# Patient Record
Sex: FEMALE | Race: WHITE | NOT HISPANIC OR LATINO
[De-identification: names, ages, dates, MRNs, and addresses within clinical notes are randomized per-mention and may not be internally consistent; named-entity substitution may affect disease eponyms.]

---

## 2022-06-15 ENCOUNTER — APPOINTMENT (OUTPATIENT)
Dept: ORTHOPEDIC SURGERY | Facility: CLINIC | Age: 77
End: 2022-06-15
Payer: MEDICARE

## 2022-06-15 VITALS — HEIGHT: 58 IN | BODY MASS INDEX: 28.34 KG/M2 | WEIGHT: 135 LBS

## 2022-06-15 DIAGNOSIS — Z63.4 DISAPPEARANCE AND DEATH OF FAMILY MEMBER: ICD-10-CM

## 2022-06-15 DIAGNOSIS — Z78.9 OTHER SPECIFIED HEALTH STATUS: ICD-10-CM

## 2022-06-15 DIAGNOSIS — Z86.39 PERSONAL HISTORY OF OTHER ENDOCRINE, NUTRITIONAL AND METABOLIC DISEASE: ICD-10-CM

## 2022-06-15 PROBLEM — Z00.00 ENCOUNTER FOR PREVENTIVE HEALTH EXAMINATION: Status: ACTIVE | Noted: 2022-06-15

## 2022-06-15 PROCEDURE — 73562 X-RAY EXAM OF KNEE 3: CPT | Mod: RT

## 2022-06-15 PROCEDURE — 20610 DRAIN/INJ JOINT/BURSA W/O US: CPT | Mod: RT

## 2022-06-15 PROCEDURE — 99204 OFFICE O/P NEW MOD 45 MIN: CPT | Mod: 25

## 2022-06-15 RX ORDER — SIMVASTATIN 20 MG/1
20 TABLET, FILM COATED ORAL
Refills: 0 | Status: ACTIVE | COMMUNITY

## 2022-06-15 SDOH — SOCIAL STABILITY - SOCIAL INSECURITY: DISSAPEARANCE AND DEATH OF FAMILY MEMBER: Z63.4

## 2022-06-15 NOTE — PHYSICAL EXAM
[5___] : hamstring 5[unfilled]/5 [Right] : right knee [AP] : anteroposterior [Lateral] : lateral [Mild tricompartmental OA medial narrowing] : Mild tricompartmental OA medial narrowing [] : no increased warmth

## 2022-06-15 NOTE — PROCEDURE
[Right] : of the right [Knee] : knee [___ cc    6mg] :  Betamethasone (Celestone) ~Vcc of 6mg [___ cc    1%] : Lidocaine ~Vcc of 1%  [] : Patient tolerated procedure well [Call if redness, pain or fever occur] : call if redness, pain or fever occur [Apply ice for 15min out of every hour for the next 12-24 hours as tolerated] : apply ice for 15 minutes out of every hour for the next 12-24 hours as tolerated [Previous OTC use and PT nontherapeutic] : patient has tried OTC's including aspirin, Ibuprofen, Aleve, etc or prescription NSAIDS, and/or exercises at home and/or physical therapy without satisfactory response [Patient had decreased mobility in the joint] : patient had decreased mobility in the joint [Risks, benefits, alternatives discussed / Verbal consent obtained] : the risks benefits, and alternatives have been discussed, and verbal consent was obtained

## 2022-06-15 NOTE — DISCUSSION/SUMMARY
[de-identified] : Options were discussed. Plan is to go forward with CSI in the right knee.\par The Risks, benefits, alternatives and expectations of the proposed procedure, as well as non-operative management, were discussed at length with the patient, as well as the procedure itself and the expected recovery period. The possible need for post operative Physical Therapy was also discussed. The patient was allowed as much tome as necessary to ask all appropriate questions and they were answered to the patient's satisfaction.\par \par f/u 1 month\par \par Entered by Agustin Allen acting as scribe.\par Dr. Aparicio Attestation\par The documentation recorded by the scribe, in my presence, accurately reflects the service I, Dr. Aparicio, personally performed, and the decisions made by me with my edits as appropriate.\par

## 2022-06-15 NOTE — DISCUSSION/SUMMARY
[de-identified] : Options were discussed. Plan is to go forward with CSI in the right knee.\par The Risks, benefits, alternatives and expectations of the proposed procedure, as well as non-operative management, were discussed at length with the patient, as well as the procedure itself and the expected recovery period. The possible need for post operative Physical Therapy was also discussed. The patient was allowed as much tome as necessary to ask all appropriate questions and they were answered to the patient's satisfaction.\par \par f/u 1 month\par \par Entered by Agustin Allen acting as scribe.\par Dr. Aparicio Attestation\par The documentation recorded by the scribe, in my presence, accurately reflects the service I, Dr. Aparicio, personally performed, and the decisions made by me with my edits as appropriate.\par

## 2022-07-27 ENCOUNTER — APPOINTMENT (OUTPATIENT)
Dept: ORTHOPEDIC SURGERY | Facility: CLINIC | Age: 77
End: 2022-07-27

## 2022-07-27 PROCEDURE — 99213 OFFICE O/P EST LOW 20 MIN: CPT

## 2022-07-27 NOTE — DISCUSSION/SUMMARY
[de-identified] : Pateint reports 50% better after CSI\par \par Options were discussed including observation, lubricant injection, MRI, scope. \par \par Plan is to observe at this time f/u PRN

## 2022-07-27 NOTE — HISTORY OF PRESENT ILLNESS
[de-identified] : Patient is here today for the right knee. Patient had CSI injection and got some good relief but could not go up stairs. Patient states the knee feels about 50% of normal. SHe has more tolerance to drive now.

## 2022-10-19 ENCOUNTER — APPOINTMENT (OUTPATIENT)
Dept: ORTHOPEDIC SURGERY | Facility: CLINIC | Age: 77
End: 2022-10-19

## 2022-10-19 PROCEDURE — 73030 X-RAY EXAM OF SHOULDER: CPT | Mod: RT

## 2022-10-19 PROCEDURE — 99203 OFFICE O/P NEW LOW 30 MIN: CPT | Mod: 25

## 2022-10-19 PROCEDURE — 20610 DRAIN/INJ JOINT/BURSA W/O US: CPT | Mod: RT

## 2022-10-19 NOTE — PHYSICAL EXAM
[Right] : right shoulder [AC Joint Arthrosis] : AC Joint Arthrosis [de-identified] : Constitutional: The patient appears well developed, well nourished. Examination of patients ability to communicate functionally was normal. \par \par Neurologic: Coordination is normal. Alert and oriented to time, place and person. No evidence of mood disorder, calm affect. \par \par     RIGHT  SHOULDER: Inspection of the shoulder/upper arm is as follows: no swelling, no erythema, no ecchymosis, no atrophy and no deformity.  DIME SIZED SCABBING OVER THE POSTEROLATERAL ACROMIAL REGION> NO SIGNS OF INFECTION NOTED>\par \par Palpation of the shoulder/upper arm is as follows: Tenderness is noted at the anterior shoulder and lateral shoulder and bicipital groove \par \par Range of motion of the shoulder is as follows in degrees:\par Pain with internal rotation, external rotation, abduction, and forward flexion. \par \par active forward flexion to: 180 \par active abduction to: 180  \par internal rotation to: T8 \par external rotation with arm to side: 90\par \par Strength of the shoulder is as follows: \par \par Forward flexion 5/5\par Abduction 5-/5\par External Rotation 5/5  \par Internal Rotation 5/5 \par \par Ligament Stability and Special Tests of the shoulder is as follows: Impingement testing is positive. Hawkin's testing is positive. There is positive arc of pain. Shoulder apprehension shows to be negative. Shoulder relocation is negative. Drop-arm testing is negative. \par \par Neurological testing of the shoulder is as follows: reflexes intact, No sensory deficits, motor and sensor intact distally and no scapular winging. \par   [FreeTextEntry1] : inferior GH joint spurring. no frx noted.

## 2022-10-19 NOTE — HISTORY OF PRESENT ILLNESS
[de-identified] : Patient is here today for the right shoulder. Patient is RHD. She also notes left shoulder pain but recently the Right has been worse since a fall to the Right shoulder. She has had pain prior for over a yrs. She notes pain lateral and posterior shoulder worse with overhead motion and at night when sleeping on it.

## 2022-10-19 NOTE — PROCEDURE
[Right] : of the right [Subacromial Space] : subacromial space [Pain] : pain [Inflammation] : inflammation [Betadine] : betadine [Sterile technique used] : sterile technique used [___ cc    6mg] :  Betamethasone (Celestone) ~Vcc of 6mg [___ cc    1%] : Lidocaine ~Vcc of 1%  [] : Patient tolerated procedure well [Call if redness, pain or fever occur] : call if redness, pain or fever occur [Apply ice for 15min out of every hour for the next 12-24 hours as tolerated] : apply ice for 15 minutes out of every hour for the next 12-24 hours as tolerated [Previous OTC use and PT nontherapeutic] : patient has tried OTC's including aspirin, Ibuprofen, Aleve, etc or prescription NSAIDS, and/or exercises at home and/or physical therapy without satisfactory response [Patient had decreased mobility in the joint] : patient had decreased mobility in the joint [Risks, benefits, alternatives discussed / Verbal consent obtained] : the risks benefits, and alternatives have been discussed, and verbal consent was obtained

## 2022-11-16 ENCOUNTER — APPOINTMENT (OUTPATIENT)
Dept: ORTHOPEDIC SURGERY | Facility: CLINIC | Age: 77
End: 2022-11-16

## 2022-12-12 ENCOUNTER — APPOINTMENT (OUTPATIENT)
Dept: ORTHOPEDIC SURGERY | Facility: CLINIC | Age: 77
End: 2022-12-12

## 2022-12-12 ENCOUNTER — TRANSCRIPTION ENCOUNTER (OUTPATIENT)
Age: 77
End: 2022-12-12

## 2022-12-12 PROCEDURE — 99204 OFFICE O/P NEW MOD 45 MIN: CPT

## 2022-12-12 RX ORDER — METHYLPREDNISOLONE 4 MG/1
4 TABLET ORAL
Qty: 1 | Refills: 0 | Status: ACTIVE | COMMUNITY
Start: 2022-12-12 | End: 1900-01-01

## 2022-12-12 NOTE — DISCUSSION/SUMMARY
[Medication Risks Reviewed] : Medication risks reviewed [de-identified] : Options were discussed in length including NSAIDS, anti-inflammatories, oral steroids, or physical therapy.\par Recommended oral steroids and physical therapy. PT script provided in office today. Pt will take medrol dose pack.\par follow up in 3-4 weeks and we will get an xray of lumbar spine.\par \par Entered by Cheyanne Wade acting as Scribe.\par Dr. Roberts Attestation\par The documentation recorded by the scribe, in my presence, accurately reflects the service I, Dr. Roberts, personally performed, and the decisions made by me with my edits as appropriate.\par

## 2022-12-12 NOTE — HISTORY OF PRESENT ILLNESS
[Lower back] : lower back [Gradual] : gradual [7] : 7 [Localized] : localized [Sharp] : sharp [Stabbing] : stabbing [Tightness] : tightness [Household chores] : household chores [Leisure] : leisure [Rest] : rest [Meds] : meds [Sitting] : sitting [Retired] : Work status: retired [de-identified] : Patient presents in office today with lower back pain. NKI chronic back pain. August 2022 she had sciatica pain radiating down her leg, treated with acupuncture and had relief. States she has felt like the pain has increased, and it is stiff. Pt denies physical therapy. [] : This patient has had an injection before: no [FreeTextEntry7] : pain is a littile more on the left side [de-identified] : bone density test

## 2022-12-15 ENCOUNTER — FORM ENCOUNTER (OUTPATIENT)
Age: 77
End: 2022-12-15

## 2022-12-20 ENCOUNTER — FORM ENCOUNTER (OUTPATIENT)
Age: 77
End: 2022-12-20

## 2023-01-09 ENCOUNTER — APPOINTMENT (OUTPATIENT)
Dept: ORTHOPEDIC SURGERY | Facility: CLINIC | Age: 78
End: 2023-01-09
Payer: MEDICARE

## 2023-01-09 PROCEDURE — 72100 X-RAY EXAM L-S SPINE 2/3 VWS: CPT

## 2023-01-09 PROCEDURE — 72040 X-RAY EXAM NECK SPINE 2-3 VW: CPT

## 2023-01-09 PROCEDURE — 99214 OFFICE O/P EST MOD 30 MIN: CPT

## 2023-01-09 RX ORDER — METHYLPREDNISOLONE 4 MG/1
4 TABLET ORAL
Qty: 1 | Refills: 0 | Status: ACTIVE | COMMUNITY
Start: 2023-01-09 | End: 1900-01-01

## 2023-01-09 NOTE — PHYSICAL EXAM
[Disc space narrowing] : Disc space narrowing [] : full ROM with pain [FreeTextEntry1] : no fx seen. mild DJD

## 2023-01-09 NOTE — DISCUSSION/SUMMARY
[Medication Risks Reviewed] : Medication risks reviewed [de-identified] : Reviewed and discussed neurologic evaluation \par A Medrol dose Fredi was prescribed today. Patient was informed that while taking the Medrol, they shouldn't be taking any other anti-inflammatories. Pt understands and all questions were answered, and possible side effects of medrol were discussed. \par Patient was referred to Dr Blackwell\par \par \par

## 2023-01-09 NOTE — ADDENDUM
[FreeTextEntry1] : CORRECTION: patient was not seen for cervical spine. The x-ray for cervical spine was sent by mistake. The patient was seen for her lumbar spine only.

## 2023-01-09 NOTE — HISTORY OF PRESENT ILLNESS
[Lower back] : lower back [Constant] : constant [Bending/leaning forward] : bending/leaning forward [Extending back] : extending back [de-identified] : 01/09/23: F/U Pt for her lower back. Pt states she has been going to PT 2x a week since 12/15/22 and says she is still the same. States she uses ice for pain relief.  [] : no [FreeTextEntry6] : N/A

## 2023-02-02 ENCOUNTER — APPOINTMENT (OUTPATIENT)
Dept: PHYSICAL MEDICINE AND REHAB | Facility: CLINIC | Age: 78
End: 2023-02-02
Payer: MEDICARE

## 2023-02-02 PROCEDURE — 99205 OFFICE O/P NEW HI 60 MIN: CPT

## 2023-02-02 NOTE — PHYSICAL EXAM
[Normal] : Normal bowel sounds, soft, non-tender, no hepato-splenomegaly and no abdominal mass palpated [FreeTextEntry1] : LOW BACK & LOWER EXTREMITIES\par \par \par REFLEXES (R)LE:		\par                   QUADRICEPS  2	 \par                   ACHILLES 2\par 	 \par REFLEXES (L) LE:		\par 	QUADRICEPS 2	\par 	 ACHILLES 2\par \par PERIPHERAL PULSES BILATERAL LE\par \par SENSORY: Intact \par \par NORMAL GAIT \par NO GROSS ATROPHY\par \par TESTING:	CHIKA’S (R) [- ]\par 	CHIKA’S (L) [- ]\par 	BABINSKI [downgoing ]\par 	CHADDOCK [- ]\par 	OPPENHEIM [ -]\par 	GONDA [- ]\par 	CLONUS [- ]\par 	LESEAGUE’S (R) [- ]\par 	LESEAGUE’S (L) [ -]\par 		DANIEL’S [- ]\par SI JT LIG.CHALLENEGE TEST (R) -\par SI JT LIG.CHALLENEGE TEST (L) +\par 	S.L.R. ( R ) -60 degrees with hamstring tightness \par 	S.L.R. ( L ) -60  degrees with hamstring tightness \par RANGE OF MOTION:\par 	FLEXION 50 degrees\par 	EXTENSION 30 degrees\par 	LATERAL BENDING: ( R ) 25 degrees\par 	LATERAL BENDING: ( L ) 30 degrees\par 	THORACIC ROTATION ( R ) 35 degrees\par 	THORACIC ROTATION ( L ) 30 degrees \par  	INTERNAL ROTATION FEMUR ( R ) WNL\par 	EXTERNAL ROTATION FEMUR ( R ) WNL \par 	INTERNAL ROTATION FEMUR ( L ) WNL \par 	EXTERNAL ROTATION FEMUR ( R ) WNL \par \par VIBRATORY: intact\par PROPRIOCEPTION: intact \par 	MMT: intact\par PALPATION OF THE LUMBAR SPINE: Tenderness involving the L4/L5 and L5/S1 interspace. Tenderness involving the bilateral sacroiliac joints with greater involvement on the left. Tenderness and moderate spasm involving the bilateral lower lumbar paraspinal musculature with greater involvement on the left. Trigger points involving the bilateral gluteal musculature with greater involvement on the left. \par

## 2023-02-02 NOTE — ASSESSMENT
[FreeTextEntry1] : TRigger points injections 1xweekly for 6 weeks goal of which is to decrease pain, dissipate muscle spasm, increase ROM, and improve level of function. \par HEP reviewed with patient \par Emphasis placed on the need for frequent change of positions from sit to stand and stand to sit.

## 2023-02-02 NOTE — HISTORY OF PRESENT ILLNESS
[FreeTextEntry1] : Pt is a 77 year old female with a hx of chronic intermittent low back pain. Pt reports appprox. 6 months she experienced an acute exacerbation of low back pain with radicular symptoms involving her left lower extremity. \par She  f/u with  and received course of acupuncture with improvement reported. She was eval by Dr. Roberts and x-ray of lumbar spine was obtained, Medrol dose pack was prescribed  and completed course of physical therapy. Pt states her radicular symptoms have improved however, she continues with c/o low back pain which is aggravated with prolonged sitting, and standing, as well as bending activities. She denies bowel or bladder dysfunction. \par Pt was referred to my office today for evaluation of trigger point injections/acupuncture for treatment of her L/S complaints.

## 2023-02-06 ENCOUNTER — APPOINTMENT (OUTPATIENT)
Dept: PHYSICAL MEDICINE AND REHAB | Facility: CLINIC | Age: 78
End: 2023-02-06
Payer: MEDICARE

## 2023-02-06 PROCEDURE — 20553 NJX 1/MLT TRIGGER POINTS 3/>: CPT

## 2023-02-06 PROCEDURE — 99213 OFFICE O/P EST LOW 20 MIN: CPT | Mod: 25

## 2023-02-06 RX ADMIN — Medication 10 %: at 00:00

## 2023-02-06 NOTE — PROCEDURE
[de-identified] : Sterile Technique Injection \par 2 Syringes of 5 cc 1 % Lidocaine HCL \par \par Left Gluteus medius \par Left Gluteus marvin \par Left gluteal medius \par \par Ice injection site PRN \par Injection tolerated well.\par \par \par EXAMINATION OF LUMBAR SPINE:\par  \par Flexion:  50° \par Extension:  20°\par Lateral Bend: R: 25° \par                        L:  25°\par  \par Palpation of the Lumbar Spine: Trigger points bilateral gluteal musculature with greater involvement on the left. \par \par MMT L/E: intact \par \par Reflexes: 2 and symmetrical throughout \par \par \par \par

## 2023-02-13 ENCOUNTER — APPOINTMENT (OUTPATIENT)
Dept: PHYSICAL MEDICINE AND REHAB | Facility: CLINIC | Age: 78
End: 2023-02-13
Payer: MEDICARE

## 2023-02-13 PROCEDURE — 20553 NJX 1/MLT TRIGGER POINTS 3/>: CPT

## 2023-02-13 PROCEDURE — 99213 OFFICE O/P EST LOW 20 MIN: CPT | Mod: 25

## 2023-02-13 RX ADMIN — Medication 10 %: at 00:00

## 2023-02-13 NOTE — PROCEDURE
[de-identified] : Pt finding TPIs beneficial. Notes decreasing pain and increased mobility. \par \par Sterile Technique Injection \par 2 Syringes of 5 cc 1 % Lidocaine HCL \par \par Left Gluteus medius \par Left Gluteus marvin \par Left gluteal medius \par \par Ice injection site PRN \par Injection tolerated well.\par \par \par EXAMINATION OF LUMBAR SPINE:\par  \par Flexion:  55° \par Extension:  20°\par Lateral Bend: R: 30° \par                        L:  25°\par  \par Palpation of the Lumbar Spine: Trigger points left gluteal musculature improving.\par \par MMT L/E: intact \par \par Reflexes: 2 and symmetrical throughout \par \par \par \par

## 2023-02-21 ENCOUNTER — APPOINTMENT (OUTPATIENT)
Dept: PHYSICAL MEDICINE AND REHAB | Facility: CLINIC | Age: 78
End: 2023-02-21
Payer: MEDICARE

## 2023-02-21 PROCEDURE — 99213 OFFICE O/P EST LOW 20 MIN: CPT | Mod: 25

## 2023-02-21 PROCEDURE — 20553 NJX 1/MLT TRIGGER POINTS 3/>: CPT

## 2023-02-21 RX ADMIN — Medication 10 %: at 00:00

## 2023-02-21 NOTE — PROCEDURE
[de-identified] : Pt finding TPIs beneficial. Notes decreasing pain, increased mobility and prolonged ability to sit. \par \par Sterile Technique Injection \par 2 Syringes of 5 cc 1 % Lidocaine HCL \par \par bilateral Gluteus medius \par bilateral Gluteus marvin \par \par Ice injection site PRN \par Injection tolerated well.\par \par \par EXAMINATION OF LUMBAR SPINE:\par  \par Flexion:  55° \par Extension:  20°\par Lateral Bend: R: 30° \par                        L:  25°\par  \par Palpation of the Lumbar Spine: Trigger points left gluteal musculature improving.\par \par MMT L/E: intact \par \par Reflexes: 2 and symmetrical throughout \par \par \par \par

## 2023-02-28 ENCOUNTER — APPOINTMENT (OUTPATIENT)
Dept: PHYSICAL MEDICINE AND REHAB | Facility: CLINIC | Age: 78
End: 2023-02-28
Payer: MEDICARE

## 2023-02-28 PROCEDURE — 20553 NJX 1/MLT TRIGGER POINTS 3/>: CPT

## 2023-02-28 PROCEDURE — 99213 OFFICE O/P EST LOW 20 MIN: CPT | Mod: 25

## 2023-02-28 RX ADMIN — Medication 10 %: at 00:00

## 2023-02-28 NOTE — PROCEDURE
[de-identified] : Pt finding TPIs beneficial. Notes decreasing pain, increased mobility, increased level of physical activity and prolonged ability to sit. Reports improvement with sleeping as well. \par \par Sterile Technique Injection \par 2 Syringes of 5 cc 1 % Lidocaine HCL \par \par Right Gluteus medius \par Right Gluteus marvin \par Right Gluteus minimus \par \par Ice injection site PRN \par Injection tolerated well.\par \par \par EXAMINATION OF LUMBAR SPINE:\par  \par Flexion:  55° \par Extension:  20°\par Lateral Bend: R: 30° \par                        L:  30°\par  \par Palpation of the Lumbar Spine: Trigger points right gluteal musculature improved. Trigger points left gluteal musculature improving, but persist. \par \par MMT L/E: intact \par \par Reflexes: 2 and symmetrical throughout \par \par \par \par

## 2023-03-01 ENCOUNTER — APPOINTMENT (OUTPATIENT)
Dept: ORTHOPEDIC SURGERY | Facility: CLINIC | Age: 78
End: 2023-03-01
Payer: MEDICARE

## 2023-03-01 PROCEDURE — 99215 OFFICE O/P EST HI 40 MIN: CPT | Mod: 25

## 2023-03-01 PROCEDURE — 73030 X-RAY EXAM OF SHOULDER: CPT | Mod: LT

## 2023-03-01 PROCEDURE — 20610 DRAIN/INJ JOINT/BURSA W/O US: CPT | Mod: LT

## 2023-03-01 NOTE — PHYSICAL EXAM
[Left] : left shoulder [There are no fractures, subluxations or dislocations. No significant abnormalities are seen] : There are no fractures, subluxations or dislocations. No significant abnormalities are seen [Type 3 acromion] : Type 3 acromion [AC Joint Arthrosis] : AC Joint Arthrosis [Glenohumeral arthritis] : Glenohumeral arthritis [de-identified] : Constitutional: The patient appears well developed, well nourished. Examination of patients ability to communicate functionally was normal.  \par \par  \par \par Neurologic: Coordination is normal. Alert and oriented to time, place and person. No evidence of mood disorder, calm affect.  \par \par  \par \par    LEFT   SHOULDER: Inspection of the shoulder/upper arm is as follows: no swelling, no erythema, no ecchymosis, no atrophy and no deformity.  \par \par  \par \par Palpation of the shoulder/upper arm is as follows: Tenderness is noted at the anterior shoulder and lateral shoulder and bicipital groove  \par \par  \par \par Range of motion of the shoulder is as follows in degrees: \par \par Pain with internal rotation, external rotation, abduction, and forward flexion.  \par \par  \par \par active forward flexion to: 170  \par \par active abduction to: 165   \par \par internal rotation to: T8  \par \par external rotation with arm to side: 90 \par \par  \par \par Strength of the shoulder is as follows:  \par \par  \par \par Forward flexion 5/5 \par \par Abduction 5/5 \par \par External Rotation 5/5   \par \par Internal Rotation 5/5  \par \par  \par \par Ligament Stability and Special Tests of the shoulder is as follows: Impingement testing is positive. Hawkin's testing is positive. There is positive arc of pain. Shoulder apprehension shows to be negative. Shoulder relocation is negative. Drop-arm testing is negative.  \par \par  \par \par Neurological testing of the shoulder is as follows: reflexes intact, No sensory deficits, motor and sensor intact distally and no scapular winging.  \par \par

## 2023-03-01 NOTE — HISTORY OF PRESENT ILLNESS
[de-identified] : patient is here today for the left shoulder. patient /co Left shoulder pain x 2 mos. denies any injury. Pain is posterolateral and lateral shoulder worse with sudden movements and at night. Denies any paresthesias or weakness. She tried OTC motrin with min relief.

## 2023-03-13 ENCOUNTER — APPOINTMENT (OUTPATIENT)
Dept: PHYSICAL MEDICINE AND REHAB | Facility: CLINIC | Age: 78
End: 2023-03-13
Payer: MEDICARE

## 2023-03-13 PROCEDURE — 20553 NJX 1/MLT TRIGGER POINTS 3/>: CPT

## 2023-03-13 PROCEDURE — 99213 OFFICE O/P EST LOW 20 MIN: CPT | Mod: 25

## 2023-03-13 NOTE — PROCEDURE
[de-identified] : Pt finding TPIs beneficial. Notes decreasing pain, increased mobility, increased level of physical activity and prolonged ability to sit. Reports improvement with sleeping as well. \par \par Sterile Technique Injection \par 2 Syringes of 5 cc 1 % Lidocaine HCL \par \par Bilateral  Gluteus medius \par BIlateral Gluteus marvin \par Bilateral  Gluteus minimus \par \par Ice injection site PRN \par Injection tolerated well.\par \par \par EXAMINATION OF LUMBAR SPINE:\par  \par Flexion:  55° \par Extension:  20°\par Lateral Bend: R: 35° \par                        L:  30°\par  \par Palpation of the Lumbar Spine: Trigger points right gluteal musculature improved. Trigger points left gluteal musculature improving, but persist. \par \par MMT L/E: intact \par \par Reflexes: 2 and symmetrical throughout \par \par \par \par

## 2023-03-22 ENCOUNTER — APPOINTMENT (OUTPATIENT)
Dept: PHYSICAL MEDICINE AND REHAB | Facility: CLINIC | Age: 78
End: 2023-03-22
Payer: MEDICARE

## 2023-03-22 PROCEDURE — 99213 OFFICE O/P EST LOW 20 MIN: CPT | Mod: 25

## 2023-03-22 PROCEDURE — 20553 NJX 1/MLT TRIGGER POINTS 3/>: CPT

## 2023-03-22 NOTE — PROCEDURE
[de-identified] : Pt finding TPIs beneficial. Notes decreasing pain, increased mobility, increased level of physical activity and prolonged ability to sit. Reports improvement with sleeping as well. \par \par Sterile Technique Injection \par 2 Syringes of 5 cc 1 % Lidocaine HCL \par \par Bilateral  Gluteus medius \par BIlateral Gluteus marvin \par Bilateral  Gluteus minimus \par \par Ice injection site PRN \par Injection tolerated well.\par \par \par EXAMINATION OF LUMBAR SPINE:\par  \par Flexion:  60° \par Extension:  20°\par Lateral Bend: R: 35° \par                        L:  35°\par  \par Palpation of the Lumbar Spine: Trigger points bilateral gluteal musculature improving, but persist. \par \par MMT L/E: intact \par \par Reflexes: 2 and symmetrical throughout \par \par \par \par

## 2023-03-27 ENCOUNTER — APPOINTMENT (OUTPATIENT)
Dept: PHYSICAL MEDICINE AND REHAB | Facility: CLINIC | Age: 78
End: 2023-03-27
Payer: MEDICARE

## 2023-03-27 PROCEDURE — 99213 OFFICE O/P EST LOW 20 MIN: CPT

## 2023-03-27 NOTE — HISTORY OF PRESENT ILLNESS
[FreeTextEntry1] : Patient reports improvement with trigger point injections to her lumbar spine, in allowing her to perform ADL's with less difficulty, dissipating muscle spasms, and overall improving her level of function.

## 2023-03-27 NOTE — PHYSICAL EXAM
[FreeTextEntry1] : EXAMINATION OF LUMBAR SPINE\par Flexion:  60°  \par Extension:  20° \par Lateral Bend: R: 30°                         \par L:  35°   \par Palpation of the Lumbar Spine: Trigger points left gluteal musculature improved. Trigger points right gluteal musculature improving but persist.\par  MMT L/E: intact   Reflexes: 2 and symmetrical throughout

## 2023-03-27 NOTE — ASSESSMENT
[FreeTextEntry1] : Continue with trigger point injections to the lumbar spine \par \par HEP reviewed with the patient \par \par

## 2023-03-29 ENCOUNTER — APPOINTMENT (OUTPATIENT)
Dept: ORTHOPEDIC SURGERY | Facility: CLINIC | Age: 78
End: 2023-03-29
Payer: MEDICARE

## 2023-03-29 PROCEDURE — 99213 OFFICE O/P EST LOW 20 MIN: CPT

## 2023-03-29 RX ORDER — DICLOFENAC SODIUM 20 MG/G
2 SOLUTION TOPICAL
Qty: 1 | Refills: 1 | Status: ACTIVE | COMMUNITY
Start: 2023-03-29 | End: 1900-01-01

## 2023-03-30 NOTE — HISTORY OF PRESENT ILLNESS
[de-identified] : following up for left shoulder. Patient had CSI on 3/01/2023.. She states the CSI did not help at all. She tried Voltaren gel  Rx 1% strength which helped her.  She states the OTC VOlt gel gave her no relief.  \par Patient notes much improvement . She notes pain lateral shoulder/upper arm and she notes pain at night.

## 2023-03-30 NOTE — PHYSICAL EXAM
[de-identified] : Constitutional: The patient appears well developed, well nourished. Examination of patients ability to communicate functionally was normal.  \par \par  \par \par Neurologic: Coordination is normal. Alert and oriented to time, place and person. No evidence of mood disorder, calm affect.  \par \par  \par \par    LEFT   SHOULDER: Inspection of the shoulder/upper arm is as follows: no swelling, no erythema, no ecchymosis, no atrophy and no deformity.  \par \par  \par \par Palpation of the shoulder/upper arm is as follows: Tenderness is noted at the anterior shoulder and lateral shoulder and bicipital groove  \par \par  \par \par Range of motion of the shoulder is as follows in degrees: \par \par Pain with internal rotation, external rotation, abduction, and forward flexion.  \par \par  \par \par active forward flexion to: 170  \par \par active abduction to: 175   \par \par internal rotation to: T8  \par \par external rotation with arm to side: 90 \par \par  \par \par Strength of the shoulder is as follows:  \par \par  \par \par Forward flexion 4+/5 \par \par Abduction 4+/5 \par \par External Rotation 5/5   \par \par Internal Rotation 5/5  \par \par  \par \par Ligament Stability and Special Tests of the shoulder is as follows: Impingement testing is positive. Hawkin's testing is positive. There is positive arc of pain. Shoulder apprehension shows to be negative. Shoulder relocation is negative. Drop-arm testing is negative.  \par \par  \par \par Neurological testing of the shoulder is as follows: reflexes intact, No sensory deficits, motor and sensor intact distally and no scapular winging.  \par \par

## 2023-03-30 NOTE — DISCUSSION/SUMMARY
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 She will not have surgery and states  "unless my arm is going to fall off".  She would like to try the max Rx strength Voltaren gel.\par She will f/u in 6-8 weeks or PRN\par Discussed PT as well, patient declined.

## 2023-04-02 ENCOUNTER — FORM ENCOUNTER (OUTPATIENT)
Age: 78
End: 2023-04-02

## 2023-04-03 ENCOUNTER — FORM ENCOUNTER (OUTPATIENT)
Age: 78
End: 2023-04-03

## 2023-04-04 ENCOUNTER — FORM ENCOUNTER (OUTPATIENT)
Age: 78
End: 2023-04-04

## 2023-04-05 ENCOUNTER — APPOINTMENT (OUTPATIENT)
Dept: PHYSICAL MEDICINE AND REHAB | Facility: CLINIC | Age: 78
End: 2023-04-05
Payer: MEDICARE

## 2023-04-05 ENCOUNTER — FORM ENCOUNTER (OUTPATIENT)
Age: 78
End: 2023-04-05

## 2023-04-05 PROCEDURE — 20553 NJX 1/MLT TRIGGER POINTS 3/>: CPT

## 2023-04-05 PROCEDURE — 99213 OFFICE O/P EST LOW 20 MIN: CPT | Mod: 25

## 2023-04-05 NOTE — PROCEDURE
[de-identified] : Pt finding TPIs beneficial. Notes decreasing pain, increased mobility, increased level of physical activity and prolonged ability to sit. Reports improvement with sleeping as well. \par \par Sterile Technique Injection \par 2 Syringes of 5 cc 1 % Lidocaine HCL \par \par Bilateral  Gluteus medius \par BIlateral Gluteus marvin \par Bilateral  Gluteus minimus \par \par Ice injection site PRN \par Injection tolerated well.\par \par \par EXAMINATION OF LUMBAR SPINE:\par  \par Flexion:  60° \par Extension:  20°\par Lateral Bend: R: 35° \par                        L:  30°\par  \par Palpation of the Lumbar Spine: Trigger points bilateral gluteal musculature improving, but persist. \par \par MMT L/E: intact \par \par Reflexes: 2 and symmetrical throughout \par \par \par \par

## 2023-04-06 ENCOUNTER — FORM ENCOUNTER (OUTPATIENT)
Age: 78
End: 2023-04-06

## 2023-04-07 ENCOUNTER — APPOINTMENT (OUTPATIENT)
Dept: ORTHOPEDIC SURGERY | Facility: CLINIC | Age: 78
End: 2023-04-07
Payer: MEDICARE

## 2023-04-07 DIAGNOSIS — Z78.9 OTHER SPECIFIED HEALTH STATUS: ICD-10-CM

## 2023-04-07 DIAGNOSIS — Z86.39 PERSONAL HISTORY OF OTHER ENDOCRINE, NUTRITIONAL AND METABOLIC DISEASE: ICD-10-CM

## 2023-04-07 PROCEDURE — 64455 NJX AA&/STRD PLTR COM DG NRV: CPT

## 2023-04-07 PROCEDURE — 99213 OFFICE O/P EST LOW 20 MIN: CPT | Mod: 25

## 2023-04-07 PROCEDURE — 73630 X-RAY EXAM OF FOOT: CPT | Mod: RT

## 2023-04-07 NOTE — ASSESSMENT
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  Nonsurgical management.\par 1.  CSI administered to 2nd web space wo complications.  well tolerated.\par 2.  metatarsal pads discussed\par 3.  activity modifications\par 4.  f/u prn

## 2023-04-07 NOTE — PROCEDURE
[Right] : of the right [Other: ____] : [unfilled] [Pain] : pain [Alcohol] : alcohol [___ cc    3mg] :  Betamethasone (Celestone) ~Vcc of 3mg [___ cc    1%] : Lidocaine ~Vcc of 1%  [Call if redness, pain or fever occur] : call if redness, pain or fever occur [Apply ice for 15min out of every hour for the next 12-24 hours as tolerated] : apply ice for 15 minutes out of every hour for the next 12-24 hours as tolerated [Patient was advised to rest the joint(s) for ____ days] : patient was advised to rest the joint(s) for [unfilled] days [Previous OTC use and PT nontherapeutic] : patient has tried OTC's including aspirin, Ibuprofen, Aleve, etc or prescription NSAIDS, and/or exercises at home and/or physical therapy without satisfactory response [Risks, benefits, alternatives discussed / Verbal consent obtained] : the risks benefits, and alternatives have been discussed, and verbal consent was obtained [de-identified] : neuroma injection

## 2023-04-07 NOTE — HISTORY OF PRESENT ILLNESS
[Gradual] : gradual [8] : 8 [0] : 0 [Sharp] : sharp [Intermittent] : intermittent [Household chores] : household chores [Leisure] : leisure [Social interactions] : social interactions [Rest] : rest [Retired] : Work status: retired [de-identified] : Patient is here for her right foot. Patient states NKI. Patient states the pain is on the bottom of her foot (ball of her foot) Patient states she went to a podiatrist 4-5 years ago and they states she had a neuroma. Patient states she feels like she is stepping on a pebble. Patient had MRI  11/18/2019 at [facility]\par \par IMPRESSION:\par \par \par Partial tear of the facet joint is likely chronic and there is irregularity of the first\par proximal phalanges of these adjacent to the tear. 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 [] : Post Surgical Visit: no [FreeTextEntry1] : Right foot  [FreeTextEntry3] : 4-5 years ago  [FreeTextEntry5] : Patient states NKI [de-identified] : Movement

## 2023-04-07 NOTE — PHYSICAL EXAM
[2nd] : 2nd [NL (40)] : plantar flexion 40 degrees [NL 30)] : inversion 30 degrees [NL (20)] : eversion 20 degrees [5___] : [facility] 5[unfilled]/5 [2+] : posterior tibialis pulse: 2+ [Normal] : saphenous nerve sensation normal [] : patient ambulates without assistive device [Right] : right foot [There are no fractures, subluxations or dislocations. No significant abnormalities are seen] : There are no fractures, subluxations or dislocations. No significant abnormalities are seen

## 2023-04-09 ENCOUNTER — FORM ENCOUNTER (OUTPATIENT)
Age: 78
End: 2023-04-09

## 2023-04-10 ENCOUNTER — FORM ENCOUNTER (OUTPATIENT)
Age: 78
End: 2023-04-10

## 2023-04-11 ENCOUNTER — FORM ENCOUNTER (OUTPATIENT)
Age: 78
End: 2023-04-11

## 2023-04-11 ENCOUNTER — APPOINTMENT (OUTPATIENT)
Dept: PHYSICAL MEDICINE AND REHAB | Facility: CLINIC | Age: 78
End: 2023-04-11

## 2023-04-12 ENCOUNTER — FORM ENCOUNTER (OUTPATIENT)
Age: 78
End: 2023-04-12

## 2023-04-13 ENCOUNTER — FORM ENCOUNTER (OUTPATIENT)
Age: 78
End: 2023-04-13

## 2023-04-17 ENCOUNTER — APPOINTMENT (OUTPATIENT)
Dept: PHYSICAL MEDICINE AND REHAB | Facility: CLINIC | Age: 78
End: 2023-04-17
Payer: MEDICARE

## 2023-04-17 PROCEDURE — 99213 OFFICE O/P EST LOW 20 MIN: CPT | Mod: 25

## 2023-04-17 PROCEDURE — 20553 NJX 1/MLT TRIGGER POINTS 3/>: CPT | Mod: 59

## 2023-04-17 NOTE — PROCEDURE
[de-identified] : Pt finding TPIs beneficial. Notes decreasing pain, increased mobility, increased level of physical activity and prolonged ability to sit. Reports improvement with sleeping as well. \par \par Sterile Technique Injection \par 2 Syringes of 5 cc 1 % Lidocaine HCL \par \par Bilateral  Gluteus medius \par BIlateral Gluteus marvin \par Bilateral  Gluteus minimus \par \par Ice injection site PRN \par Injection tolerated well.\par \par \par EXAMINATION OF LUMBAR SPINE:\par  \par Flexion:  60° \par Extension:  25°\par Lateral Bend: R: 35° \par                        L:  30°\par  \par Palpation of the Lumbar Spine: Trigger points bilateral gluteal musculature improving, but persist. \par \par MMT L/E: intact \par \par Reflexes: 2 and symmetrical throughout \par \par \par \par

## 2023-05-01 ENCOUNTER — APPOINTMENT (OUTPATIENT)
Dept: PHYSICAL MEDICINE AND REHAB | Facility: CLINIC | Age: 78
End: 2023-05-01
Payer: MEDICARE

## 2023-05-01 PROCEDURE — 20553 NJX 1/MLT TRIGGER POINTS 3/>: CPT

## 2023-05-01 PROCEDURE — 99213 OFFICE O/P EST LOW 20 MIN: CPT | Mod: 25

## 2023-05-01 NOTE — PROCEDURE
[de-identified] : Pt finding TPIs beneficial. Notes decreasing pain, increased mobility.\par \par \par Sterile Technique Injection \par 2 Syringes of 5 cc 1 % Lidocaine HCL \par \par Bilateral  Gluteus medius \par BIlateral Gluteus marvin \par Bilateral  Gluteus minimus \par \par Ice injection site PRN \par Injection tolerated well.\par \par \par EXAMINATION OF LUMBAR SPINE:\par  \par Flexion:  62° \par Extension:  25°\par Lateral Bend: R: 35° \par                        L:  30°\par  \par Palpation of the Lumbar Spine: Trigger points bilateral gluteal musculature improving, but persist. \par \par MMT L/E: intact \par \par Reflexes: 2 and symmetrical throughout \par \par \par \par

## 2023-05-08 ENCOUNTER — APPOINTMENT (OUTPATIENT)
Dept: PHYSICAL MEDICINE AND REHAB | Facility: CLINIC | Age: 78
End: 2023-05-08

## 2023-05-09 ENCOUNTER — APPOINTMENT (OUTPATIENT)
Dept: ORTHOPEDIC SURGERY | Facility: CLINIC | Age: 78
End: 2023-05-09
Payer: MEDICARE

## 2023-05-09 DIAGNOSIS — G57.61 LESION OF PLANTAR NERVE, RIGHT LOWER LIMB: ICD-10-CM

## 2023-05-09 PROCEDURE — 99213 OFFICE O/P EST LOW 20 MIN: CPT | Mod: 25

## 2023-05-09 PROCEDURE — 64455 NJX AA&/STRD PLTR COM DG NRV: CPT | Mod: RT

## 2023-05-09 NOTE — PROCEDURE
[Right] : of the right [Other: ____] : [unfilled] [Pain] : pain [Alcohol] : alcohol [___ cc    3mg] :  Betamethasone (Celestone) ~Vcc of 3mg [___ cc    1%] : Lidocaine ~Vcc of 1%  [Call if redness, pain or fever occur] : call if redness, pain or fever occur [Apply ice for 15min out of every hour for the next 12-24 hours as tolerated] : apply ice for 15 minutes out of every hour for the next 12-24 hours as tolerated [Patient was advised to rest the joint(s) for ____ days] : patient was advised to rest the joint(s) for [unfilled] days [Previous OTC use and PT nontherapeutic] : patient has tried OTC's including aspirin, Ibuprofen, Aleve, etc or prescription NSAIDS, and/or exercises at home and/or physical therapy without satisfactory response [Risks, benefits, alternatives discussed / Verbal consent obtained] : the risks benefits, and alternatives have been discussed, and verbal consent was obtained [de-identified] : neuroma injection

## 2023-05-09 NOTE — PHYSICAL EXAM
[2nd] : 2nd [NL (40)] : plantar flexion 40 degrees [NL 30)] : inversion 30 degrees [NL (20)] : eversion 20 degrees [5___] : [facility] 5[unfilled]/5 [2+] : posterior tibialis pulse: 2+ [Normal] : saphenous nerve sensation normal [Right] : right foot [There are no fractures, subluxations or dislocations. No significant abnormalities are seen] : There are no fractures, subluxations or dislocations. No significant abnormalities are seen [] : non-antalgic

## 2023-05-09 NOTE — HISTORY OF PRESENT ILLNESS
[Gradual] : gradual [8] : 8 [0] : 0 [Sharp] : sharp [Intermittent] : intermittent [Household chores] : household chores [Leisure] : leisure [Social interactions] : social interactions [Rest] : rest [Retired] : Work status: retired [de-identified] : Patient is here for her right foot. Patient is being treated for a Neuroma. Patient had CSI on 4/7/23. Patient states the injection did not help but, would like to try another CSI today.  [] : Post Surgical Visit: no [FreeTextEntry1] : Right foot  [FreeTextEntry3] : 4-5 years ago  [FreeTextEntry5] : Patient states NKI [de-identified] : Movement  [de-identified] : 4/7/2023 [de-identified] : right foot [de-identified] : cortisone

## 2023-05-09 NOTE — ASSESSMENT
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  First CSI provided no significant relief.  Patient requesting a second injection.  Nonsurgical management.\par 1.  CSI administered to 2nd web space wo complications.  well tolerated.\par 2.  metatarsal pads discussed\par 3.  activity modifications\par 4.  f/u prn

## 2023-05-15 ENCOUNTER — APPOINTMENT (OUTPATIENT)
Dept: PHYSICAL MEDICINE AND REHAB | Facility: CLINIC | Age: 78
End: 2023-05-15
Payer: MEDICARE

## 2023-05-15 DIAGNOSIS — M17.11 UNILATERAL PRIMARY OSTEOARTHRITIS, RIGHT KNEE: ICD-10-CM

## 2023-05-15 PROCEDURE — 99213 OFFICE O/P EST LOW 20 MIN: CPT | Mod: 25

## 2023-05-15 PROCEDURE — 20553 NJX 1/MLT TRIGGER POINTS 3/>: CPT

## 2023-05-15 NOTE — PROCEDURE
[de-identified] : Pt finding TPIs beneficial. Notes decreasing pain, increased mobility.\par \par \par Sterile Technique Injection \par 2 Syringes of 5 cc 1 % Lidocaine HCL \par \par Bilateral  Gluteus medius \par BIlateral Gluteus marvin \par Bilateral  Gluteus minimus \par \par Ice injection site PRN \par Injection tolerated well.\par \par \par EXAMINATION OF LUMBAR SPINE:\par  \par Flexion:  62° \par Extension:  25°\par Lateral Bend: R: 35° \par                        L:  30°\par  \par Palpation of the Lumbar Spine: Trigger points bilateral gluteal musculature improving, but persist. \par \par MMT L/E: intact \par \par Reflexes: 2 and symmetrical throughout \par \par \par \par

## 2023-05-24 ENCOUNTER — APPOINTMENT (OUTPATIENT)
Dept: PHYSICAL MEDICINE AND REHAB | Facility: CLINIC | Age: 78
End: 2023-05-24
Payer: MEDICARE

## 2023-05-24 PROCEDURE — 99213 OFFICE O/P EST LOW 20 MIN: CPT

## 2023-05-24 NOTE — HISTORY OF PRESENT ILLNESS
[FreeTextEntry1] : Pt reports finding TPIs beneficial in alleviating low back pain and dissipating muscle spasm. Pt has been able increase level of physical activity and perform ADL'S with less difficulty.

## 2023-05-24 NOTE — PHYSICAL EXAM
[FreeTextEntry1] : EXAMINATION OF LUMBAR SPINE:\par  \par Flexion:  60° \par Extension:  20°\par Lateral Bend: R: 35° \par                        L:  30°\par  \par Palpation of the Lumbar Spine: Trigger points bilateral gluteal musculature improving. Lumbar spine improved. \par MMT L/E: intact \par \par Reflexes: 2 and symmetrical throughout \par

## 2023-06-13 ENCOUNTER — APPOINTMENT (OUTPATIENT)
Dept: PHYSICAL MEDICINE AND REHAB | Facility: CLINIC | Age: 78
End: 2023-06-13
Payer: MEDICARE

## 2023-06-13 PROCEDURE — 20553 NJX 1/MLT TRIGGER POINTS 3/>: CPT

## 2023-06-13 PROCEDURE — 99213 OFFICE O/P EST LOW 20 MIN: CPT | Mod: 25

## 2023-06-13 NOTE — PROCEDURE
[de-identified] : Pt finding TPIs beneficial. Notes decreasing pain, increased mobility.  Increase level of function\par \par \par Sterile Technique Injection \par 2 Syringes of 5 cc 1 % Lidocaine HCL \par \par Bilateral  Gluteus medius \par BIlateral Gluteus marvin \par Bilateral  Gluteus minimus \par \par Ice injection site PRN \par Injection tolerated well.\par \par \par EXAMINATION OF LUMBAR SPINE:\par  \par Flexion:  656° \par Extension:  25°\par Lateral Bend: R: 35° \par                        L:  30°\par  \par Palpation of the Lumbar Spine: Trigger points bilateral gluteal musculature improving, \par \par MMT L/E: intact \par \par Reflexes: 2 and symmetrical throughout \par \par \par \par

## 2023-06-14 ENCOUNTER — APPOINTMENT (OUTPATIENT)
Dept: ORTHOPEDIC SURGERY | Facility: CLINIC | Age: 78
End: 2023-06-14

## 2023-06-20 ENCOUNTER — APPOINTMENT (OUTPATIENT)
Dept: PHYSICAL MEDICINE AND REHAB | Facility: CLINIC | Age: 78
End: 2023-06-20

## 2023-07-03 ENCOUNTER — APPOINTMENT (OUTPATIENT)
Dept: PHYSICAL MEDICINE AND REHAB | Facility: CLINIC | Age: 78
End: 2023-07-03
Payer: MEDICARE

## 2023-07-03 PROCEDURE — 99213 OFFICE O/P EST LOW 20 MIN: CPT | Mod: 25

## 2023-07-03 PROCEDURE — 20553 NJX 1/MLT TRIGGER POINTS 3/>: CPT

## 2023-07-03 NOTE — PROCEDURE
[de-identified] : Pt finding TPIs beneficial. Notes decreasing pain, increased mobility.  Increase level of function\par \par \par Sterile Technique Injection \par 2 Syringes of 5 cc 1 % Lidocaine HCL \par \par Bilateral  Gluteus medius \par BIlateral Gluteus marvin \par Bilateral  Gluteus minimus \par \par Ice injection site PRN \par Injection tolerated well.\par \par \par EXAMINATION OF LUMBAR SPINE:\par  \par Flexion:  65° \par Extension:  25°\par Lateral Bend: R: 35° \par                        L:  30°\par  \par Palpation of the Lumbar Spine: Trigger points bilateral gluteal musculature improving, \par \par MMT L/E: intact \par \par Reflexes: 2 and symmetrical throughout \par \par \par \par

## 2023-07-24 ENCOUNTER — APPOINTMENT (OUTPATIENT)
Dept: PHYSICAL MEDICINE AND REHAB | Facility: CLINIC | Age: 78
End: 2023-07-24

## 2023-08-03 ENCOUNTER — APPOINTMENT (OUTPATIENT)
Dept: PHYSICAL MEDICINE AND REHAB | Facility: CLINIC | Age: 78
End: 2023-08-03
Payer: MEDICARE

## 2023-08-03 PROCEDURE — 99213 OFFICE O/P EST LOW 20 MIN: CPT | Mod: 25

## 2023-08-03 PROCEDURE — 20553 NJX 1/MLT TRIGGER POINTS 3/>: CPT

## 2023-08-03 NOTE — PROCEDURE
[de-identified] : Pt finding TPIs beneficial. Notes decreasing pain, increased mobility.  Increase level of function   Sterile Technique Injection  2 Syringes of 5 cc 1 % Lidocaine HCL   Bilateral  Gluteus medius  BIlateral Gluteus marvin  Bilateral  Gluteus minimus   Ice injection site PRN  Injection tolerated well.   EXAMINATION OF LUMBAR SPINE:   Flexion:  60  Extension:  20 Lateral Bend: R: 35                         L:  30   Palpation of the Lumbar Spine: Trigger points bilateral gluteal musculature improving,   MMT L/E: intact   Reflexes: 2 and symmetrical throughout

## 2023-08-08 ENCOUNTER — APPOINTMENT (OUTPATIENT)
Dept: PHYSICAL MEDICINE AND REHAB | Facility: CLINIC | Age: 78
End: 2023-08-08
Payer: MEDICARE

## 2023-08-08 DIAGNOSIS — M75.42 IMPINGEMENT SYNDROME OF LEFT SHOULDER: ICD-10-CM

## 2023-08-08 PROCEDURE — 99215 OFFICE O/P EST HI 40 MIN: CPT

## 2023-08-08 NOTE — HISTORY OF PRESENT ILLNESS
[FreeTextEntry1] : Pt reports finding TPIs beneficial in alleviating low back pain and dissipating muscle spasm.  Pt has been able increase level of physical activity and perform ADL'S with less difficulty. Pt reports good response to last sessions of trigger point injections.   negative - no vomiting, no diarrhea

## 2023-08-08 NOTE — ASSESSMENT
[FreeTextEntry1] : HEP reviewed with pt  Requesting TPI 6 Sessions L/S  Goals of which are to decrease pain, dissipate muscle spasm, increase ROM and improve level of function. Recheck in 2 months.

## 2023-08-08 NOTE — PHYSICAL EXAM
[FreeTextEntry1] : EXAMINATION OF LUMBAR SPINE:   Flexion:  60 degrees   Extension:  20 degrees  Lateral Bend: R: 35 degrees                         L:  35 degrees    Palpation of the Lumbar Spine: Trigger points bilateral gluteal musculature improving. Lumbar spine improved.  MMT L/E: intact  Sensory: intact  SLR - 60 degrees bilaterally SI JT mildly positive on the left. Babinski down going bilaterally.  Reflexes: 2 and symmetrical throughout   [Normal] : Heart rate was normal and rhythm regular, normal S1 and S2, no gallops, no murmurs and no pericardial rub

## 2023-08-22 ENCOUNTER — APPOINTMENT (OUTPATIENT)
Dept: PHYSICAL MEDICINE AND REHAB | Facility: CLINIC | Age: 78
End: 2023-08-22
Payer: MEDICARE

## 2023-08-22 DIAGNOSIS — M54.32 SCIATICA, LEFT SIDE: ICD-10-CM

## 2023-08-22 PROCEDURE — 20553 NJX 1/MLT TRIGGER POINTS 3/>: CPT

## 2023-08-22 PROCEDURE — 99213 OFFICE O/P EST LOW 20 MIN: CPT | Mod: 25

## 2023-08-22 NOTE — PROCEDURE
[de-identified] : Pt finding TPIs beneficial. Notes decreasing pain, increased mobility.  Increase level of function   Sterile Technique Injection  2 Syringes of 5 cc 1 % Lidocaine HCL   Bilateral Gluteus Medius  Bilateral Gluteus marvin  Bilateral Gluteus minimums   Ice injection site PRN  Injection tolerated well.   EXAMINATION OF LUMBAR SPINE:   Flexion:  60 degrees Extension:  20 degrees  Lateral Bend: R: 35 degrees                        L:  30 degrees    Palpation of the Lumbar Spine: Trigger points bilateral gluteal musculature improving,   MMT L/E: intact   Reflexes: 2 and symmetrical throughout

## 2023-08-29 ENCOUNTER — APPOINTMENT (OUTPATIENT)
Dept: PHYSICAL MEDICINE AND REHAB | Facility: CLINIC | Age: 78
End: 2023-08-29
Payer: MEDICARE

## 2023-08-29 PROCEDURE — 20553 NJX 1/MLT TRIGGER POINTS 3/>: CPT

## 2023-08-29 NOTE — PROCEDURE
[de-identified] : Pt finding TPIs beneficial. Notes decreasing pain, increased mobility. She continues with c/o low back pain with intermittent radiation of pain bilateral lower extremities.    Sterile Technique Injection  2 Syringes of 5 cc 1 % Lidocaine HCL   Bilateral Gluteus Medius  Bilateral Gluteus marvin  Bilateral Gluteus minimums   Ice injection site PRN  Injection tolerated well.   EXAMINATION OF LUMBAR SPINE:   Flexion:  60 degrees Extension:  20 degrees  Lateral Bend: R: 35 degrees                        L:  30 degrees    Palpation of the Lumbar Spine: Trigger points bilateral gluteal musculature improving,   MMT L/E: intact   Reflexes: 2 and symmetrical throughout

## 2023-08-30 ENCOUNTER — APPOINTMENT (OUTPATIENT)
Dept: OTOLARYNGOLOGY | Facility: CLINIC | Age: 78
End: 2023-08-30
Payer: MEDICARE

## 2023-08-30 VITALS — TEMPERATURE: 97 F | BODY MASS INDEX: 28.34 KG/M2 | HEIGHT: 58 IN | WEIGHT: 135 LBS

## 2023-08-30 DIAGNOSIS — H90.3 SENSORINEURAL HEARING LOSS, BILATERAL: ICD-10-CM

## 2023-08-30 DIAGNOSIS — L29.9 PRURITUS, UNSPECIFIED: ICD-10-CM

## 2023-08-30 DIAGNOSIS — H60.543 ACUTE ECZEMATOID OTITIS EXTERNA, BILATERAL: ICD-10-CM

## 2023-08-30 DIAGNOSIS — H61.23 IMPACTED CERUMEN, BILATERAL: ICD-10-CM

## 2023-08-30 DIAGNOSIS — H93.12 TINNITUS, LEFT EAR: ICD-10-CM

## 2023-08-30 PROCEDURE — 92567 TYMPANOMETRY: CPT

## 2023-08-30 PROCEDURE — G0268 REMOVAL OF IMPACTED WAX MD: CPT

## 2023-08-30 PROCEDURE — 99204 OFFICE O/P NEW MOD 45 MIN: CPT | Mod: 25

## 2023-08-30 PROCEDURE — 92557 COMPREHENSIVE HEARING TEST: CPT

## 2023-08-30 RX ORDER — FLUOCINOLONE ACETONIDE 0.25 MG/G
0.03 OINTMENT TOPICAL TWICE DAILY
Qty: 1 | Refills: 0 | Status: ACTIVE | COMMUNITY
Start: 2023-08-30 | End: 1900-01-01

## 2023-08-30 NOTE — PHYSICAL EXAM
[Midline] : trachea located in midline position [Normal] : no mass and no adenopathy [de-identified] : auscultation of neck normal  [de-identified] : xs cerumen bilat - dry eac bilat  [de-identified] : after cerumen removal

## 2023-08-30 NOTE — REVIEW OF SYSTEMS
[Ear Itch] : ear itch [Negative] : Heme/Lymph [de-identified] : ringing on the left ear when laying, right ear slightly muffled

## 2023-08-30 NOTE — HISTORY OF PRESENT ILLNESS
[de-identified] : c/o occ noise when lying on left ear.  Right ear occ clogged.   No other problems.  No left sx if not lying on ear.  ? decreased hearing.   Does not use q tips.  Also notes ear itch.   no prior ear issues.

## 2023-08-30 NOTE — ASSESSMENT
[FreeTextEntry1] : Patient with c/o occ ear itch and also occ tinnitus - ? more in left ear.  Exam unremarkable including auscultation of left neck.  Patient has bilat snhl and discussed relation of hearing loss and tinnitus - recommended HAE . Also reocmmended flucinolone ointment for ear itch.  Followup in 6 mos and and as necessry

## 2023-09-14 ENCOUNTER — APPOINTMENT (OUTPATIENT)
Dept: PHYSICAL MEDICINE AND REHAB | Facility: CLINIC | Age: 78
End: 2023-09-14

## 2023-09-20 ENCOUNTER — APPOINTMENT (OUTPATIENT)
Dept: PHYSICAL MEDICINE AND REHAB | Facility: CLINIC | Age: 78
End: 2023-09-20
Payer: MEDICARE

## 2023-09-20 PROCEDURE — 20553 NJX 1/MLT TRIGGER POINTS 3/>: CPT

## 2023-09-20 PROCEDURE — 99213 OFFICE O/P EST LOW 20 MIN: CPT | Mod: 25

## 2023-09-27 ENCOUNTER — APPOINTMENT (OUTPATIENT)
Dept: PHYSICAL MEDICINE AND REHAB | Facility: CLINIC | Age: 78
End: 2023-09-27
Payer: MEDICARE

## 2023-09-27 PROCEDURE — 99213 OFFICE O/P EST LOW 20 MIN: CPT | Mod: 25

## 2023-09-27 PROCEDURE — 20553 NJX 1/MLT TRIGGER POINTS 3/>: CPT

## 2023-10-02 ENCOUNTER — APPOINTMENT (OUTPATIENT)
Dept: PHYSICAL MEDICINE AND REHAB | Facility: CLINIC | Age: 78
End: 2023-10-02
Payer: MEDICARE

## 2023-10-02 PROCEDURE — 20553 NJX 1/MLT TRIGGER POINTS 3/>: CPT

## 2023-10-02 PROCEDURE — 99213 OFFICE O/P EST LOW 20 MIN: CPT | Mod: 25

## 2023-10-11 ENCOUNTER — APPOINTMENT (OUTPATIENT)
Dept: PHYSICAL MEDICINE AND REHAB | Facility: CLINIC | Age: 78
End: 2023-10-11
Payer: MEDICARE

## 2023-10-11 PROCEDURE — 99213 OFFICE O/P EST LOW 20 MIN: CPT | Mod: 25

## 2023-10-11 PROCEDURE — 20553 NJX 1/MLT TRIGGER POINTS 3/>: CPT

## 2023-10-16 ENCOUNTER — APPOINTMENT (OUTPATIENT)
Dept: PHYSICAL MEDICINE AND REHAB | Facility: CLINIC | Age: 78
End: 2023-10-16
Payer: MEDICARE

## 2023-10-16 PROCEDURE — 20553 NJX 1/MLT TRIGGER POINTS 3/>: CPT

## 2023-10-16 PROCEDURE — 99213 OFFICE O/P EST LOW 20 MIN: CPT | Mod: 25

## 2023-10-23 ENCOUNTER — APPOINTMENT (OUTPATIENT)
Dept: PHYSICAL MEDICINE AND REHAB | Facility: CLINIC | Age: 78
End: 2023-10-23
Payer: MEDICARE

## 2023-10-23 PROCEDURE — 99213 OFFICE O/P EST LOW 20 MIN: CPT

## 2023-11-07 ENCOUNTER — NON-APPOINTMENT (OUTPATIENT)
Age: 78
End: 2023-11-07

## 2023-11-07 ENCOUNTER — APPOINTMENT (OUTPATIENT)
Dept: PHYSICAL MEDICINE AND REHAB | Facility: CLINIC | Age: 78
End: 2023-11-07

## 2023-11-07 ENCOUNTER — APPOINTMENT (OUTPATIENT)
Dept: PHYSICAL MEDICINE AND REHAB | Facility: CLINIC | Age: 78
End: 2023-11-07
Payer: MEDICARE

## 2023-11-07 PROCEDURE — 20553 NJX 1/MLT TRIGGER POINTS 3/>: CPT

## 2023-11-07 PROCEDURE — 99213 OFFICE O/P EST LOW 20 MIN: CPT | Mod: 25

## 2023-11-20 ENCOUNTER — APPOINTMENT (OUTPATIENT)
Dept: PHYSICAL MEDICINE AND REHAB | Facility: CLINIC | Age: 78
End: 2023-11-20
Payer: MEDICARE

## 2023-11-20 PROCEDURE — 99213 OFFICE O/P EST LOW 20 MIN: CPT | Mod: 25

## 2023-11-20 PROCEDURE — 20553 NJX 1/MLT TRIGGER POINTS 3/>: CPT

## 2023-12-11 ENCOUNTER — APPOINTMENT (OUTPATIENT)
Dept: PHYSICAL MEDICINE AND REHAB | Facility: CLINIC | Age: 78
End: 2023-12-11
Payer: MEDICARE

## 2023-12-11 PROCEDURE — 20553 NJX 1/MLT TRIGGER POINTS 3/>: CPT

## 2023-12-11 PROCEDURE — 99213 OFFICE O/P EST LOW 20 MIN: CPT | Mod: 25

## 2024-01-03 ENCOUNTER — APPOINTMENT (OUTPATIENT)
Dept: PHYSICAL MEDICINE AND REHAB | Facility: CLINIC | Age: 79
End: 2024-01-03
Payer: MEDICARE

## 2024-01-03 PROCEDURE — 99213 OFFICE O/P EST LOW 20 MIN: CPT | Mod: 25

## 2024-01-03 PROCEDURE — 20553 NJX 1/MLT TRIGGER POINTS 3/>: CPT

## 2024-01-03 NOTE — HISTORY OF PRESENT ILLNESS
[FreeTextEntry1] : Patient presents for evaluation of Lumbar Spine pain.  She reports intermittent low back pain.  Denies radicular symptoms.  Pain is aggravated with prolonged sitting and/or standing  Examination of the Lumbar Spine: Flexion: 55 degrees Extension: 18 degrees   (normal - 30) Lateral Bending ( R ) 25 degrees:  (normal - 35) Lateral Bending ( L ): 30 degrees (normal - 35) Thoracic Rotation ( R ): 30    degrees (normal - 35) Thoracic Rotation ( L ): 35      degrees (normal - 35)  MMT: Intact Sensory L/E: Grossly intact SI Jt Lig.Challenege Test (R) negative SI Jt Lig.Challenege Test (L) negative S.L.R. ( R ) -70 degrees S.L.R. ( L ) -70 degrees  Palpation: Trigger points identified involving the bilateral gluteal marvin  Based on today's evaluation and findings consistent with isolated trigger points involving the bilateral gluteus amrvin musculature TPI injection therapy is indicated. Goals of which are to decrease pain, dissipate muscle spasm, increase ROM and improve level of function.  Sterile Technique Injection  2 Syringes of 5 cc 1 % Lidocaine HCL Right gluteus marvin Left gluteus marvin  Ice injection site PRN Injection tolerated well.  Multiple areas were identified using palpation guidance. Using aseptic technique, each of these areas right gluteus marvin, left gluteus marvin were isolated and the skin prepped with alcohol.   A 22 gauge 1 1/2 inch needle was inserted to the level of the muscle. At this point, a slight twitch was elicited and in some cases the patient identified referred pain to areas distant from the injection site.   All of these were consistent with the definition of a trigger point.  Aspiration revealed no blood and a mixture of 5cc 1 % Lidocaine HCL was injected in increments of 1-2 mL into each of these areas for a total of 4 sites. The needle was removed. Hemostasis was achieved with direct pressure.   The patient tolerated the procedure well. Post-procedure exam did not reveal any new neurological deficits. The patient was instructed to call with fever, chills, increased pain, redness or swelling at the injection site, or numbness or weakness in the lower extremities. The patient was discharged home in good condition with post-procedural instructions. After today's examination additional trigger points were identified involving the right gluteus marvin, left gluteus maximu, thus indicating the need for additional trigger point therapy. Goals of which are to decrease pain, dissipate muscle spasm, increase ROM and improve level of function, and avoid regression of gains made from previous tx.  Recheck 1- 2 weeks.

## 2024-01-29 ENCOUNTER — APPOINTMENT (OUTPATIENT)
Dept: PHYSICAL MEDICINE AND REHAB | Facility: CLINIC | Age: 79
End: 2024-01-29

## 2024-02-12 ENCOUNTER — APPOINTMENT (OUTPATIENT)
Dept: PHYSICAL MEDICINE AND REHAB | Facility: CLINIC | Age: 79
End: 2024-02-12
Payer: MEDICARE

## 2024-02-12 PROCEDURE — 20553 NJX 1/MLT TRIGGER POINTS 3/>: CPT

## 2024-02-12 PROCEDURE — 99213 OFFICE O/P EST LOW 20 MIN: CPT | Mod: 25

## 2024-02-12 NOTE — HISTORY OF PRESENT ILLNESS
[FreeTextEntry1] : Pt reports overall low back has been doing well. However, over the past week she has been experiencing increasing low back pain denies radicular symptoms.   Examination of the Lumbar Spine: Flexion: 60 degrees Extension: 15 degrees   (normal - 30) Lateral Bending ( R ) 25 degrees:  (normal - 35) Lateral Bending ( L ): 25 degrees (normal - 35) Thoracic Rotation ( R ): 30    degrees (normal - 35) Thoracic Rotation ( L ): 35      degrees (normal - 35)  MMT: Intact Sensory L/E: Grossly intact SI Jt Lig.Challenege Test (R) negative SI Jt Lig.Challenege Test (L) negative S.L.R. ( R ) -70 degrees S.L.R. ( L ) -70 degrees   REFLEXES (R)LE:     QUADRICEPS 2   ACHILLES 2  REFLEXES (L) LE:    QUADRICEPS 2  ACHILLES 2   Palpation: Trigger points identified involving the bilateral gluteal marvin and Medius musculature.   Based on today's evaluation and findings consistent with isolated trigger points involving the bilateral gluteal marvin and Medius musculature. TPI injection therapy is indicated. Goals of which are to decrease pain, dissipate muscle spasm, increase ROM and improve level of function.  Sterile Technique Injection  2 Syringes of 5 cc 1 % Lidocaine HCL bilateral gluteal marvin and Medius musculature.   Ice injection site PRN Injection tolerated well.  Multiple areas were identified using palpation guidance. Using aseptic technique, each of these areas bilateral gluteal marvin and Medius musculature.  were isolated and the skin prepped with alcohol.   A 22 gauge 1 1/2 inch needle was inserted to the level of the muscle. At this point, a slight twitch was elicited and in some cases the patient identified referred pain to areas distant from the injection site.   All of these were consistent with the definition of a trigger point.  Aspiration revealed no blood and a mixture of 5cc 1 % Lidocaine HCL was injected in increments of 1-2 mL into each of these areas for a total of 4 sites. The needle was removed. Hemostasis was achieved with direct pressure.   The patient tolerated the procedure well. Post-procedure exam did not reveal any new neurological deficits. The patient was instructed to call with fever, chills, increased pain, redness or swelling at the injection site, or numbness or weakness in the lower extremities. The patient was discharged home in good condition with post-procedural instructions. After today's examination additional trigger points were identified involving thebilateral gluteal marvin and Medius musculature.   thus indicating the need for additional trigger point therapy. Goals of which are to decrease pain, dissipate muscle spasm, increase ROM and improve level of function, and avoid regression of gains made from previous tx.  Recheck 4 weeks

## 2024-03-04 ENCOUNTER — APPOINTMENT (OUTPATIENT)
Dept: PHYSICAL MEDICINE AND REHAB | Facility: CLINIC | Age: 79
End: 2024-03-04
Payer: MEDICARE

## 2024-03-04 PROCEDURE — 99213 OFFICE O/P EST LOW 20 MIN: CPT | Mod: 25

## 2024-03-04 PROCEDURE — 20553 NJX 1/MLT TRIGGER POINTS 3/>: CPT

## 2024-03-04 NOTE — PHYSICAL EXAM
[FreeTextEntry1] : Examination of the Lumbar Spine: Flexion:  65 degrees Extension: 15 degrees (normal - 30) Lateral Bending ( R ) 30 degrees: (normal - 35) Lateral Bending ( L ): 25 degrees (normal - 35) Thoracic Rotation ( R ): 30 degrees (normal - 35) Thoracic Rotation ( L ): 35 degrees (normal - 35)  MMT: Intact Sensory L/E: Grossly intact SI Jt Lig.Challenege Test (R) negative SI Jt Lig.Challenege Test (L) negative S.L.R. ( R ) -70 degrees S.L.R. ( L ) -70 degrees   REFLEXES (R)LE:  QUADRICEPS 2 ACHILLES 2  REFLEXES (L) LE: QUADRICEPS 2 ACHILLES 2   Palpation: Trigger points identified involving the bilateral gluteal marvin musculature.  Based on today's evaluation and findings consistent with isolated trigger points involving the bilateral gluteal marvin musculature. TPI injection therapy is indicated. Goals of which are to decrease pain, dissipate muscle spasm, increase ROM and improve level of function.  Sterile Technique Injection  2 Syringes of 5 cc 1 % Lidocaine HCL bilateral gluteal marvin musculature.  Ice injection site PRN Injection tolerated well.  Multiple areas were identified using palpation guidance. Using aseptic technique, each of these areas bilateral gluteal marvin musculature. were isolated and the skin prepped with alcohol.  A 22 gauge 1 1/2 inch needle was inserted to the level of the muscle. At this point, a slight twitch was elicited and in some cases the patient identified referred pain to areas distant from the injection site.  All of these were consistent with the definition of a trigger point.  Aspiration revealed no blood and a mixture of 5cc 1 % Lidocaine HCL was injected in increments of 1-2 mL into each of these areas for a total of 4 sites. The needle was removed. Hemostasis was achieved with direct pressure.  The patient tolerated the procedure well. Post-procedure exam did not reveal any new neurological deficits. The patient was instructed to call with fever, chills, increased pain, redness or swelling at the injection site, or numbness or weakness in the lower extremities. The patient was discharged home in good condition with post-procedural instructions. After today's examination additional trigger points were identified involving the bilateral gluteal marvin and Medius musculature. thus indicating the need for additional trigger point therapy. Goals of which are to decrease pain, dissipate muscle spasm, increase ROM and improve level of function, and avoid regression of gains made from previous tx.  Recheck 4 weeks [Normal] : Heart rate was normal and rhythm regular, normal S1 and S2, no gallops, no murmurs and no pericardial rub

## 2024-03-04 NOTE — HISTORY OF PRESENT ILLNESS
[FreeTextEntry1] : Pt reports overall doing well however she does note some gluteal discomfort with prolonged sitting and/or standing.

## 2024-03-07 ENCOUNTER — APPOINTMENT (OUTPATIENT)
Dept: PHYSICAL MEDICINE AND REHAB | Facility: CLINIC | Age: 79
End: 2024-03-07

## 2024-03-11 ENCOUNTER — APPOINTMENT (OUTPATIENT)
Dept: PHYSICAL MEDICINE AND REHAB | Facility: CLINIC | Age: 79
End: 2024-03-11
Payer: MEDICARE

## 2024-03-11 PROCEDURE — 99213 OFFICE O/P EST LOW 20 MIN: CPT

## 2024-03-11 NOTE — HISTORY OF PRESENT ILLNESS
[FreeTextEntry1] : Patient reports she continues to experience intermittent low back pain which is aggravated with prolonged sitting and/or ambulation.  However overall her level of function has significantly improved with TPI therapy.  She reports she continues to find TPI therapy beneficial in terms of reducing pain decreased use of pharmaceutical agents and increase level of functional recreational activity.

## 2024-03-11 NOTE — ASSESSMENT
[FreeTextEntry1] : Home exercise plan reviewed with patient. Stressed the importance for the need for frequent change in position from sit to stand and stand to sit, as well as use of weight shifting techniques to alleviate low back discomfort. Instruction in proper posturing, body mechanics and lifting techniques.   Recheck in 4 to 6 weeks

## 2024-03-11 NOTE — PHYSICAL EXAM
[Normal] : Heart rate was normal and rhythm regular, normal S1 and S2, no gallops, no murmurs and no pericardial rub [FreeTextEntry1] : Examination of the Lumbar Spine: Flexion: 60  degrees Extension: 20 degrees (normal - 30) Lateral Bending ( R ) 30 degrees: (normal - 35) Lateral Bending ( L ): 30 degrees (normal - 35) Thoracic Rotation ( R ): 35 degrees (normal - 35) Thoracic Rotation ( L ): 35 degrees (normal - 35)  MMT: Intact Sensory L/E: Grossly intact SI Jt Lig.Challenege Test (R) negative SI Jt Lig.Challenege Test (L) negative S.L.R. ( R ) -70 degrees S.L.R. ( L ) -70 degrees   REFLEXES (R)LE:  QUADRICEPS 2 ACHILLES 2  REFLEXES (L) LE: QUADRICEPS 2 ACHILLES 2   Palpation: Mild trigger points identified involving the bilateral gluteus marvin and medius musculature.

## 2024-04-01 ENCOUNTER — APPOINTMENT (OUTPATIENT)
Dept: PHYSICAL MEDICINE AND REHAB | Facility: CLINIC | Age: 79
End: 2024-04-01
Payer: MEDICARE

## 2024-04-01 PROCEDURE — 20553 NJX 1/MLT TRIGGER POINTS 3/>: CPT

## 2024-04-01 PROCEDURE — 99213 OFFICE O/P EST LOW 20 MIN: CPT | Mod: 25

## 2024-04-01 NOTE — PHYSICAL EXAM
[Normal] : Heart rate was normal and rhythm regular, normal S1 and S2, no gallops, no murmurs and no pericardial rub [FreeTextEntry1] : Examination of the Lumbar Spine: Flexion: 55 degrees Extension: 10 degrees (normal - 30) Lateral Bending ( R ) 25 degrees: (normal - 35) Lateral Bending ( L ): 30 degrees (normal - 35) Thoracic Rotation ( R ): 35 degrees (normal - 35) Thoracic Rotation ( L ): 35 degrees (normal - 35)  MMT: Intact Sensory L/E: Grossly intact SI Jt Lig.Challenege Test (R) negative SI Jt Lig.Challenege Test (L) negative S.L.R. ( R ) -70 degrees S.L.R. ( L ) -70 degrees  REFLEXES (R)LE:  QUADRICEPS 2 ACHILLES 2  REFLEXES (L) LE: QUADRICEPS 2 ACHILLES 2  Palpation: Trigger points identified involving the bilateral gluteal marvin and medius musculature.  Based on today's evaluation and findings consistent with isolated trigger points involving the bilateral gluteal marvin and medius musculature.. TPI injection therapy is indicated. Goals of which are to decrease pain, dissipate muscle spasm, increase ROM and improve level of function.  Sterile Technique Injection  2 Syringes of 5 cc 1 % Lidocaine HCL bilateral gluteal marvin and medius musculature. Ice injection site PRN Injection tolerated well.  Multiple areas were identified using palpation guidance. Using aseptic technique, each of these areas bilateral gluteal marvin musculature. were isolated and the skin prepped with alcohol.  A 22 gauge 1 1/2 inch needle was inserted to the level of the muscle. At this point, a slight twitch was elicited and in some cases the patient identified referred pain to areas distant from the injection site.  All of these were consistent with the definition of a trigger point.  Aspiration revealed no blood and a mixture of 5cc 1 % Lidocaine HCL was injected in increments of 1-2 mL into each of these areas for a total of 4 sites. The needle was removed. Hemostasis was achieved with direct pressure.  The patient tolerated the procedure well. Post-procedure exam did not reveal any new neurological deficits. The patient was instructed to call with fever, chills, increased pain, redness or swelling at the injection site, or numbness or weakness in the lower extremities. The patient was discharged home in good condition with post-procedural instructions. After today's examination additional trigger points were identified involving the bilateral gluteal marvin and Medius musculature. thus indicating the need for additional trigger point therapy. Goals of which are to decrease pain, dissipate muscle spasm, increase ROM and improve level of function, and avoid regression of gains made from previous tx.  Recheck 4 weeks.

## 2024-04-01 NOTE — HISTORY OF PRESENT ILLNESS
[FreeTextEntry1] : Pt reports intermittent low back pain which is aggravated with prolonged sitting and standing. Pt reports exacerbation of low back pain last week. Denies radicular symptoms

## 2024-04-09 ENCOUNTER — APPOINTMENT (OUTPATIENT)
Dept: PHYSICAL MEDICINE AND REHAB | Facility: CLINIC | Age: 79
End: 2024-04-09
Payer: MEDICARE

## 2024-04-09 PROCEDURE — 99213 OFFICE O/P EST LOW 20 MIN: CPT | Mod: 25

## 2024-04-09 PROCEDURE — 20553 NJX 1/MLT TRIGGER POINTS 3/>: CPT

## 2024-04-09 NOTE — PHYSICAL EXAM
[FreeTextEntry1] : Examination of the Lumbar Spine: Flexion: 50 degrees Extension: 10 degrees (normal - 30) Lateral Bending ( R ) 20 degrees: (normal - 35) Lateral Bending ( L ): 30 degrees (normal - 35) Thoracic Rotation ( R ): 35 degrees (normal - 35) Thoracic Rotation ( L ): 30 degrees (normal - 35)  MMT: Intact Sensory L/E: Grossly intact SI Jt Lig.Challenege Test (R) positive SI Jt Lig.Challenege Test (L) negative S.L.R. ( R ) -70 degrees S.L.R. ( L ) -70 degrees  Good internal and external rotation of the right hip REFLEXES (R)LE:  QUADRICEPS 2 ACHILLES 2  REFLEXES (L) LE: QUADRICEPS 2 ACHILLES 2  Palpation: Isolated trigger points identified involving the right gluteus marvin medius and piriformis musculature  Based on today's evaluation and findings consistent with isolated trigger points involving the right gluteus marvin medius and piriformis musculature. TPI injection therapy is indicated. Goals of which are to decrease pain, dissipate muscle spasm, increase ROM and improve level of function.  Sterile Technique Injection  2 Syringes of 5 cc 1 % Lidocaine HCL right gluteus marvin medius and piriformis musculature Ice injection site PRN Injection tolerated well.  Multiple areas were identified using palpation guidance. Using aseptic technique, each of these areas right gluteus mravin medius and piriformis musculature were isolated and the skin prepped with alcohol.  A 22 gauge 1 1/2 inch needle was inserted to the level of the muscle. At this point, a slight twitch was elicited and in some cases the patient identified referred pain to areas distant from the injection site.  All of these were consistent with the definition of a trigger point.  Aspiration revealed no blood and a mixture of 5cc 1 % Lidocaine HCL was injected in increments of 3-4 mL into each of these areas for a total of 3 sites. The needle was removed. Hemostasis was achieved with direct pressure.  The patient tolerated the procedure well. Post-procedure exam did not reveal any new neurological deficits. The patient was instructed to call with fever, chills, increased pain, redness or swelling at the injection site, or numbness or weakness in the lower extremities. The patient was discharged home in good condition with post-procedural instructions. After today's examination additional trigger points were identified involving the bilateral gluteal marvin and Medius musculature. thus indicating the need for additional trigger point therapy. Goals of which are to decrease pain, dissipate muscle spasm, increase ROM and improve level of function, and avoid regression of gains made from previous tx.  Recheck 4 weeks.   [Normal] : Heart rate was normal and rhythm regular, normal S1 and S2, no gallops, no murmurs and no pericardial rub

## 2024-04-09 NOTE — HISTORY OF PRESENT ILLNESS
[FreeTextEntry1] : Patient reports she has experienced an exacerbation of right-sided low back pain this past week.  Pain is aggravated with prolonged sitting understanding.  She reports intermittent paresthesia involving her right lower extremity.

## 2024-04-16 ENCOUNTER — APPOINTMENT (OUTPATIENT)
Dept: PHYSICAL MEDICINE AND REHAB | Facility: CLINIC | Age: 79
End: 2024-04-16
Payer: MEDICARE

## 2024-04-16 ENCOUNTER — RESULT REVIEW (OUTPATIENT)
Age: 79
End: 2024-04-16

## 2024-04-16 DIAGNOSIS — G89.29 LOW BACK PAIN, UNSPECIFIED: ICD-10-CM

## 2024-04-16 DIAGNOSIS — M54.50 LOW BACK PAIN, UNSPECIFIED: ICD-10-CM

## 2024-04-16 DIAGNOSIS — M54.17 RADICULOPATHY, LUMBOSACRAL REGION: ICD-10-CM

## 2024-04-16 PROCEDURE — 99214 OFFICE O/P EST MOD 30 MIN: CPT

## 2024-04-16 RX ORDER — MELOXICAM 15 MG/1
15 TABLET ORAL
Refills: 0 | Status: ACTIVE | COMMUNITY

## 2024-04-16 NOTE — HISTORY OF PRESENT ILLNESS
[FreeTextEntry1] : Patient reports improvement to low back following last session of TPI therapy.  Patient continues to find TPI therapy beneficial on an intermittent basis in terms of diminishing low back pain, dissipating muscle spasm and improving level of function.  Patient reports over the past week to 10 days has been experiencing right hip discomfort.  She reports that she has been unable to sleep on her right side due to right hip pain.

## 2024-04-16 NOTE — PHYSICAL EXAM
[Normal] : Heart rate was normal and rhythm regular, normal S1 and S2, no gallops, no murmurs and no pericardial rub [FreeTextEntry1] : Examination of the Lumbar Spine: Flexion: 55 degrees Extension: 15 degrees (normal - 30) Lateral Bending ( R ) 25 degrees: (normal - 35) Lateral Bending ( L ): 30 degrees (normal - 35) Thoracic Rotation ( R ): 35 degrees (normal - 35) Thoracic Rotation ( L ): 30 degrees (normal - 35)  MMT: Intact Sensory L/E: Grossly intact SI Jt Lig.Challenege Test (R) positive SI Jt Lig.Challenege Test (L) negative S.L.R. ( R ) -70 degrees S.L.R. ( L ) -70 degrees  Good internal and external rotation of the right hip REFLEXES (R)LE:  QUADRICEPS 2 ACHILLES 2  REFLEXES (L) LE: QUADRICEPS 2 ACHILLES 2  Palpation: Mild to moderate tenderness involving lower lumbar paraspinal musculature, trigger points right gluteal improving Examination of right hip: There is good flexion extension internal and external rotation Hip musculature grossly intact No swelling or increased temperature Palpation: Tenderness involving the greater trochanteric region and ITB band

## 2024-04-16 NOTE — ASSESSMENT
[FreeTextEntry1] : X-ray right hip   Mobic 15 mg po qd with meal #15   PT right hip 2-3 times weekly x 6 weeks Ice, ES, right hip Massage to ITB band Range of motion of the hip Gentle ITB band stretching Strengthening hip musculature Instruction home exercise program  Home exercise plan reviewed with patient. Stressed the importance for the need for frequent change in position from sit to stand and stand to sit, as well as use of weight shifting techniques to alleviate low back discomfort. Instruction in proper posturing, body mechanics and lifting techniques.   Recheck in 4 to 6 weeks

## 2024-04-17 RX ORDER — MELOXICAM 15 MG/1
15 TABLET ORAL DAILY
Qty: 30 | Refills: 0 | Status: ACTIVE | COMMUNITY
Start: 2024-04-17 | End: 1900-01-01

## 2024-05-06 ENCOUNTER — APPOINTMENT (OUTPATIENT)
Dept: PHYSICAL MEDICINE AND REHAB | Facility: CLINIC | Age: 79
End: 2024-05-06
Payer: MEDICARE

## 2024-05-06 PROCEDURE — 20553 NJX 1/MLT TRIGGER POINTS 3/>: CPT

## 2024-05-06 PROCEDURE — 99213 OFFICE O/P EST LOW 20 MIN: CPT | Mod: 25

## 2024-05-06 NOTE — END OF VISIT
[Time Spent: ___ minutes] : I have spent [unfilled] minutes of time on the encounter. [FreeTextEntry3] :  The following information has been documented by Flor Gonzalez acting as a scribe. The documentation recorded by the scribe, in my presence, accurately reflects the service I, Dr. Altamirano, personally performed, and the decisions made by me with my edits as appropriate.

## 2024-05-06 NOTE — PHYSICAL EXAM
[Normal] : Heart rate was normal and rhythm regular, normal S1 and S2, no gallops, no murmurs and no pericardial rub [FreeTextEntry1] : Examination of the Lumbar Spine: Flexion: 50 degrees Extension: 15 degrees (normal - 30) Lateral Bending ( R ) 30 degrees: (normal - 35) Lateral Bending ( L ): 30 degrees (normal - 35) Thoracic Rotation ( R ): 35 degrees (normal - 35) Thoracic Rotation ( L ): 30 degrees (normal - 35)  MMT: Intact Sensory L/E: Grossly intact SI Jt Lig.Challenege Test (R) mildly positive SI Jt Lig.Challenege Test (L) negative S.L.R. ( R ) -70 degrees S.L.R. ( L ) -70 degrees  Good internal and external rotation of the right hip REFLEXES (R)LE:  QUADRICEPS 2 ACHILLES 2  REFLEXES (L) LE: QUADRICEPS 2 ACHILLES 2  Palpation: Isolated trigger points identified involving the bilateral gluteus marvin and Medius musculature  Based on today's evaluation and findings consistent with isolated trigger points involving thebilateral gluteus marvin and Medius musculature   TPI injection therapy is indicated. Goals of which are to decrease pain, dissipate muscle spasm, increase ROM and improve level of function.  Sterile Technique Injection  2 Syringes of 5 cc 1 % Lidocaine HCL bilateral gluteus marvin and Medius musculature Ice injection site PRN Injection tolerated well.  Multiple areas were identified using palpation guidance. Using aseptic technique, each of these areas bilateral gluteus marvin and Medius musculature were isolated and the skin prepped with alcohol.  A 22 gauge 1 1/2 inch needle was inserted to the level of the muscle. At this point, a slight twitch was elicited and in some cases the patient identified referred pain to areas distant from the injection site.  All of these were consistent with the definition of a trigger point.  Aspiration revealed no blood and a mixture of 5cc 1 % Lidocaine HCL was injected in increments of 3-4 mL into each of these areas for a total of 3 sites. The needle was removed. Hemostasis was achieved with direct pressure.  The patient tolerated the procedure well. Post-procedure exam did not reveal any new neurological deficits. The patient was instructed to call with fever, chills, increased pain, redness or swelling at the injection site, or numbness or weakness in the lower extremities. The patient was discharged home in good condition with post-procedural instructions. After today's examination additional trigger points were identified involving the bilateral gluteal marvin and Medius musculature. thus indicating the need for additional trigger point therapy. Goals of which are to decrease pain, dissipate muscle spasm, increase ROM and improve level of function, and avoid regression of gains made from previous tx.  Recheck 4 weeks.

## 2024-05-06 NOTE — HISTORY OF PRESENT ILLNESS
[FreeTextEntry1] : Pt reports improvement with trigger point therapy in the past.  Continues to find TPI therapy beneficial when experiencing muscle spasm.  Overall she reports has been able to increase her level of function and is able to sit stand and ambulate for longer periods of time with decreased use of pharmaceutical agents.  X-ray of right hip reviewed with patient Patient presents for evaluation of intermittent low back pain.

## 2024-06-03 ENCOUNTER — APPOINTMENT (OUTPATIENT)
Dept: PHYSICAL MEDICINE AND REHAB | Facility: CLINIC | Age: 79
End: 2024-06-03
Payer: MEDICARE

## 2024-06-03 DIAGNOSIS — M70.61 TROCHANTERIC BURSITIS, RIGHT HIP: ICD-10-CM

## 2024-06-03 DIAGNOSIS — M76.31 ILIOTIBIAL BAND SYNDROME, RIGHT LEG: ICD-10-CM

## 2024-06-03 PROCEDURE — 99213 OFFICE O/P EST LOW 20 MIN: CPT | Mod: 25

## 2024-06-03 PROCEDURE — 20610 DRAIN/INJ JOINT/BURSA W/O US: CPT | Mod: RT

## 2024-06-03 NOTE — HISTORY OF PRESENT ILLNESS
[FreeTextEntry1] : Patient presents today for reevaluation of right hip.  She has been receiving physical therapy which provided some degree of relief however she continues to complain of right hip pain as well as pain involving the ITB region.  Pain is aggravated with prolonged ambulation and she reports difficulty sleeping on her right side. [id number]

## 2024-06-03 NOTE — PHYSICAL EXAM
[FreeTextEntry1] : Examination right hip: Palpation: Tenderness involving the greater trochanteric region, decreased tenderness involving the ITB however tenderness does persist. On range of motion testing there was good range of motion in terms of flexion, abduction, adduction, extension, internal rotation, and external rotation, Manual muscle testing of the hip musculature intact Trendelenburg test negative Patient advised of the followin)rest the joint injected for 24 hours 2) leave the bandage in place for 24 hours, may bathe/shower after removing the bandage; 3) apply ice over the bandage as needed for pain, may also use Tylenol for pain if needed; 4) call if the area becomes red or if any fever develops. Sterile Technique Injection 1 Syringes of 4 cc 1 % Lidocaine HCL, 1 cc Dexamethasone Trochanteric Bursa  Ice injection site PRN Injection tolerated well. The patient tolerated the procedure well. Post-procedure exam did not reveal any new neurological deficits. The patient was instructed to call with fever, chills, increased pain, redness or swelling at the injection site, or numbness or weakness in the lower extremities. The patient was discharged home in good condition with post-procedural instructions.  At least 20 minutes was spent with patient face to face examining patient, reviewing findings/results, counseling patient and coordinating treatment program. Ample time was provided to answer any questions or address concerns to the patient's satisfaction.

## 2024-07-01 ENCOUNTER — APPOINTMENT (OUTPATIENT)
Dept: PHYSICAL MEDICINE AND REHAB | Facility: CLINIC | Age: 79
End: 2024-07-01
Payer: MEDICARE

## 2024-07-01 DIAGNOSIS — M62.838 OTHER MUSCLE SPASM: ICD-10-CM

## 2024-07-01 DIAGNOSIS — G89.29 LOW BACK PAIN, UNSPECIFIED: ICD-10-CM

## 2024-07-01 DIAGNOSIS — M51.9 UNSPECIFIED THORACIC, THORACOLUMBAR AND LUMBOSACRAL INTERVERTEBRAL DISC DISORDER: ICD-10-CM

## 2024-07-01 DIAGNOSIS — M54.50 LOW BACK PAIN, UNSPECIFIED: ICD-10-CM

## 2024-07-01 PROCEDURE — 99213 OFFICE O/P EST LOW 20 MIN: CPT | Mod: 25

## 2024-07-01 PROCEDURE — 20553 NJX 1/MLT TRIGGER POINTS 3/>: CPT

## 2024-07-24 ENCOUNTER — APPOINTMENT (OUTPATIENT)
Dept: ORTHOPEDIC SURGERY | Facility: CLINIC | Age: 79
End: 2024-07-24
Payer: MEDICARE

## 2024-07-24 DIAGNOSIS — M17.11 UNILATERAL PRIMARY OSTEOARTHRITIS, RIGHT KNEE: ICD-10-CM

## 2024-07-24 PROCEDURE — 20610 DRAIN/INJ JOINT/BURSA W/O US: CPT

## 2024-07-24 PROCEDURE — 73562 X-RAY EXAM OF KNEE 3: CPT | Mod: RT

## 2024-07-24 PROCEDURE — 99214 OFFICE O/P EST MOD 30 MIN: CPT | Mod: 25

## 2024-07-24 NOTE — HISTORY OF PRESENT ILLNESS
[de-identified] : Patient is here for right knee pain for at least a mos. She denies any injury. She notes pain with stairs or sleeping at night.  She notes pain anterior and lateral.  She has tried volt gel as well as NSAIDS however they bother her stomach.   SHe was seen by Dr Aparicio 6/22 and given CSI

## 2024-07-24 NOTE — PHYSICAL EXAM
[Right] : right knee [de-identified] : Constitutional: The patient appears well developed, well nourished. Examination of patients ability to communicate functionally was normal.       Neurologic: Coordination is normal. Alert and oriented to time, place and person. No evidence of mood disorder, calm affect.      RIGHT       KNEE  : Inspection of the knee is as follows: MILD TO  moderate effusion. no ecchymosis, no streaking, no erythema, no atrophy, no deformities of the quad tendon and no deformities of patellar tendon.       Palpation of the knee is as follows: medial joint line tenderness, lateral joint line tenderness, medial facet of patella tenderness, lateral facet of patella tenderness and crepitus. no palpable masses and no increased warmth.       Knee Range of Motion is as follows in degrees:        Extension: 0    Flexion: 125        Strength examination of the knee is as follows:    Quadriceps strength is 5/5    Hamstring strength is 5/5       Ligament Stability and Special Test ligamentously stable, negative anterior draw, negative Lachman test, negative posterior draw and no varus or valgus instability.    negative McMurrays test and able to do active straight leg raise without an extensor lag.       Neurological examination of the knee is as follows: light touch is intact throughout.       Gait and function is as follows: mildly antalgic gait.  [FreeTextEntry9] : ap/lat/skiers show mild to mod joint space narrowing patellar spurring.

## 2024-07-24 NOTE — PROCEDURE
[Large Joint Injection] : Large joint injection [Right] : of the right [Knee] : knee [Pain] : pain [Inflammation] : inflammation [Betadine] : betadine [Sterile technique used] : sterile technique used [___ cc    6mg] :  Betamethasone (Celestone) ~Vcc of 6mg [___ cc    1%] : Lidocaine ~Vcc of 1%  [] : Patient tolerated procedure well [Call if redness, pain or fever occur] : call if redness, pain or fever occur [Apply ice for 15min out of every hour for the next 12-24 hours as tolerated] : apply ice for 15 minutes out of every hour for the next 12-24 hours as tolerated [Previous OTC use and PT nontherapeutic] : patient has tried OTC's including aspirin, Ibuprofen, Aleve, etc or prescription NSAIDS, and/or exercises at home and/or physical therapy without satisfactory response [Patient had decreased mobility in the joint] : patient had decreased mobility in the joint [Risks, benefits, alternatives discussed / Verbal consent obtained] : the risks benefits, and alternatives have been discussed, and verbal consent was obtained

## 2024-07-24 NOTE — DISCUSSION/SUMMARY
[de-identified] : Extensive discussion of the options was had with the patient. This discussion included both surgical and nonsurgical options. Options including but not limited to cortisone injection, visco-supplementation, physical therapy, prescription oral anti-inflammatories,  were discussed with the patient. We also discussed the option of observation, allowing the patient to continue rest, ice, prescription drug NSAIDs and following up if the condition does not improve. Time was taken to go over any questions the patient had in regard to any of the treatment plans described. Patient has tried Voltarn Gel with no relief.  Patient was given a CSI in the right knee, advised to ice if sore, and will f/u in 1 month.  The following information has been documented by Lana Mora acting as a scribe. Dr. Aparicio Attestation The documentation recorded by the scribe, in my presence, accurately reflects the service I, Dr. Aparicio, personally performed, and the decisions made by me with my edits as appropriate.

## 2024-07-25 ENCOUNTER — APPOINTMENT (OUTPATIENT)
Dept: PHYSICAL MEDICINE AND REHAB | Facility: CLINIC | Age: 79
End: 2024-07-25
Payer: MEDICARE

## 2024-07-25 DIAGNOSIS — G89.29 LOW BACK PAIN, UNSPECIFIED: ICD-10-CM

## 2024-07-25 DIAGNOSIS — M54.50 LOW BACK PAIN, UNSPECIFIED: ICD-10-CM

## 2024-07-25 DIAGNOSIS — M51.9 UNSPECIFIED THORACIC, THORACOLUMBAR AND LUMBOSACRAL INTERVERTEBRAL DISC DISORDER: ICD-10-CM

## 2024-07-25 DIAGNOSIS — M62.838 OTHER MUSCLE SPASM: ICD-10-CM

## 2024-07-25 DIAGNOSIS — M54.17 RADICULOPATHY, LUMBOSACRAL REGION: ICD-10-CM

## 2024-07-25 PROCEDURE — 20553 NJX 1/MLT TRIGGER POINTS 3/>: CPT

## 2024-07-25 PROCEDURE — 99213 OFFICE O/P EST LOW 20 MIN: CPT | Mod: 25

## 2024-07-25 NOTE — PHYSICAL EXAM
[Normal] : Heart rate was normal and rhythm regular, normal S1 and S2, no gallops, no murmurs and no pericardial rub [FreeTextEntry1] : Examination of the Lumbar Spine: REFLEXES (R)LE:  QUADRICEPS 2 ACHILLES 2  REFLEXES (L) LE: QUADRICEPS 2 ACHILLES 2  Flexion: 60 degrees Extension: 15 degrees (normal - 30) Lateral Bending ( R ) 30 degrees: (normal - 35) Lateral Bending ( L ): 30 degrees (normal - 35) Thoracic Rotation ( R ): 35 degrees (normal - 35) Thoracic Rotation ( L ): 30 degrees (normal - 35)  MMT: Intact Sensory L/E: Grossly intact SI Jt Lig.Challenege Test (R) positive SI Jt Lig.Challenege Test (L) negative S.L.R. ( R ) -70 degrees S.L.R. ( L ) -70 degrees  Palpation: Isolated trigger points identified involving the bilateral gluteus marvin and medius musculature  Based on today's evaluation and findings consistent with isolated trigger points involving bilateral gluteus marvin and medius musculature. TPI injection therapy is indicated. Goals of which are to decrease pain, dissipate muscle spasm, increase ROM and improve level of function.  Sterile Technique Injection  2 Syringes of 5 cc 1 % Lidocaine HCL bilateral gluteus marvin and medius musculature Ice injection site PRN Injection tolerated well.  Multiple areas were identified using palpation guidance. Using aseptic technique, each of these areas bilateral gluteus marvin and medius musculature were isolated and the skin prepped with alcohol.  A 22 gauge 1 1/2 inch needle was inserted to the level of the muscle. At this point, a slight twitch was elicited and in some cases the patient identified referred pain to areas distant from the injection site.  All of these were consistent with the definition of a trigger point.  Aspiration revealed no blood and a mixture of 5cc 1 % Lidocaine HCL was injected in increments of 3-4 mL into each of these areas for a total of 4 sites. The needle was removed. Hemostasis was achieved with direct pressure.  The patient tolerated the procedure well. Post-procedure exam did not reveal any new neurological deficits. The patient was instructed to call with fever, chills, increased pain, redness or swelling at the injection site, or numbness or weakness in the lower extremities. The patient was discharged home in good condition with post-procedural instructions. After today's examination additional trigger points were identified involving the bilateral gluteal marvin and Medius musculature. thus indicating the need for additional trigger point therapy. Goals of which are to decrease pain, dissipate muscle spasm, increase ROM and improve level of function, and avoid regression of gains made from previous tx.  Recheck 4 weeks.

## 2024-07-25 NOTE — HISTORY OF PRESENT ILLNESS
[FreeTextEntry1] : Patient reports last session of TPI therapy beneficial.  However she reports over the past several days she has been experiencing some increasing gluteal muscle spasm.  Denies radicular symptoms involving lower extremities.  Presents today for reevaluation.

## 2024-08-05 ENCOUNTER — APPOINTMENT (OUTPATIENT)
Dept: PHYSICAL MEDICINE AND REHAB | Facility: CLINIC | Age: 79
End: 2024-08-05

## 2024-08-12 ENCOUNTER — APPOINTMENT (OUTPATIENT)
Dept: PHYSICAL MEDICINE AND REHAB | Facility: CLINIC | Age: 79
End: 2024-08-12
Payer: MEDICARE

## 2024-08-12 DIAGNOSIS — M62.838 OTHER MUSCLE SPASM: ICD-10-CM

## 2024-08-12 DIAGNOSIS — G89.29 LOW BACK PAIN, UNSPECIFIED: ICD-10-CM

## 2024-08-12 DIAGNOSIS — M54.50 LOW BACK PAIN, UNSPECIFIED: ICD-10-CM

## 2024-08-12 PROCEDURE — 99213 OFFICE O/P EST LOW 20 MIN: CPT | Mod: 25

## 2024-08-12 PROCEDURE — 20553 NJX 1/MLT TRIGGER POINTS 3/>: CPT

## 2024-08-12 NOTE — PHYSICAL EXAM
[FreeTextEntry1] : Examination of the Lumbar Spine: REFLEXES (R)LE:  QUADRICEPS 2 ACHILLES 2  REFLEXES (L) LE: QUADRICEPS 2 ACHILLES 2  Flexion: 60 degrees Extension: 20 degrees (normal - 30) Lateral Bending ( R ) 30 degrees: (normal - 35) Lateral Bending ( L ): 30 degrees (normal - 35) Thoracic Rotation ( R ): 35 degrees (normal - 35) Thoracic Rotation ( L ): 30 degrees (normal - 35)  MMT: Intact Sensory L/E: Grossly intact SI Jt Lig.Challenege Test (R) positive SI Jt Lig.Challenege Test (L) negative S.L.R. ( R ) -70 degrees S.L.R. ( L ) -70 degrees  Palpation: Isolated trigger points identified involving the right gluteus marvin right, Medius, and right piriformis musculature  Based on today's evaluation and findings consistent with isolated trigger points involvingright gluteus marvin right, Medius, and right piriformis musculature TPI injection therapy is indicated. Goals of which are to decrease pain, dissipate muscle spasm, increase ROM and improve level of function.  Sterile Technique Injection  2 Syringes of 5 cc 1 % Lidocaine HCL right gluteus marvin right, Medius, and right piriformis musculature Ice injection site PRN Injection tolerated well.  Multiple areas were identified using palpation guidance. Using aseptic technique, each of these areas right gluteus marvin right, Medius, and right piriformis musculature  were isolated and the skin prepped with alcohol.  A 22 gauge 1 1/2 inch needle was inserted to the level of the muscle. At this point, a slight twitch was elicited and in some cases the patient identified referred pain to areas distant from the injection site.  All of these were consistent with the definition of a trigger point.  Aspiration revealed no blood and a mixture of 5cc 1 % Lidocaine HCL was injected in increments of 3-4 mL into each of these areas for a total of 4 sites. The needle was removed. Hemostasis was achieved with direct pressure.  The patient tolerated the procedure well. Post-procedure exam did not reveal any new neurological deficits. The patient was instructed to call with fever, chills, increased pain, redness or swelling at the injection site, or numbness or weakness in the lower extremities. The patient was discharged home in good condition with post-procedural instructions. After today's examination additional trigger points were identified involving the bilateral gluteal marvin and Medius musculature. thus indicating the need for additional trigger point therapy. Goals of which are to decrease pain, dissipate muscle spasm, increase ROM and improve level of function, and avoid regression of gains made from previous tx.  At least 20 minutes was spent with patient face to face examining patient, reviewing findings/results, counseling patient and coordinating treatment program. Ample time was provided to answer any questions or address concerns to the patient's satisfaction.  Recheck 4 weeks.  To assess clinical response to TPI therapy and need for additional treatment.      [Normal] : Heart rate was normal and rhythm regular, normal S1 and S2, no gallops, no murmurs and no pericardial rub

## 2024-08-12 NOTE — END OF VISIT
[FreeTextEntry3] :  The following information has been documented by Flor Gonzalez acting as a scribe. The documentation recorded by the scribe, in my presence, accurately reflects the service I, Dr. Altamirano, personally performed, and the decisions made by me with my edits as appropriate.  [Time Spent: ___ minutes] : I have spent [unfilled] minutes of time on the encounter.

## 2024-08-12 NOTE — HISTORY OF PRESENT ILLNESS
[FreeTextEntry1] : Patient reports she had been doing well until this past weekend when she slept on her right side and experienced an exacerbation of right-sided low back pain.  Denies radicular symptoms involving right lower extremities presents today for reevaluation

## 2024-08-21 ENCOUNTER — APPOINTMENT (OUTPATIENT)
Dept: ORTHOPEDIC SURGERY | Facility: CLINIC | Age: 79
End: 2024-08-21

## 2024-09-09 ENCOUNTER — APPOINTMENT (OUTPATIENT)
Dept: PHYSICAL MEDICINE AND REHAB | Facility: CLINIC | Age: 79
End: 2024-09-09
Payer: MEDICARE

## 2024-09-09 DIAGNOSIS — M51.9 UNSPECIFIED THORACIC, THORACOLUMBAR AND LUMBOSACRAL INTERVERTEBRAL DISC DISORDER: ICD-10-CM

## 2024-09-09 DIAGNOSIS — M54.17 RADICULOPATHY, LUMBOSACRAL REGION: ICD-10-CM

## 2024-09-09 PROCEDURE — 99213 OFFICE O/P EST LOW 20 MIN: CPT

## 2024-09-09 NOTE — HISTORY OF PRESENT ILLNESS
[FreeTextEntry1] : Patient reports that she continues to find TPI therapy beneficial in terms of decreasing low back pain, improving range of motion, anticipating muscle spasm.  Reports increased level of function with TPI therapy.  Patient reports low back pain is aggravated with prolonged sitting standing and/or ambulation.

## 2024-09-09 NOTE — END OF VISIT
[Time Spent: ___ minutes] : I have spent [unfilled] minutes of time on the encounter which excludes teaching and separately reported services. [FreeTextEntry3] :  The following information has been documented by Flor Gonzalez acting as a scribe. The documentation recorded by the scribe, in my presence, accurately reflects the service I, Dr. Altamirano, personally performed, and the decisions made by me with my edits as appropriate.

## 2024-09-09 NOTE — ASSESSMENT
[FreeTextEntry1] : Continue with TPI therapy as clinically indicated.  Home exercise plan reviewed with patient. Stressed the importance for the need for frequent change in position from sit to stand and stand to sit, as well as use of weight shifting techniques to alleviate low back discomfort. Instruction in proper posturing, body mechanics and lifting techniques.   At least 20 minutes was spent with patient face to face examining patient, reviewing findings/results, counseling patient and coordinating treatment program. Ample time was provided to answer any questions or address concerns to the patient's satisfaction.  Recheck in 4 to 6 weeks

## 2024-09-09 NOTE — PHYSICAL EXAM
[Normal] : Heart rate was normal and rhythm regular, normal S1 and S2, no gallops, no murmurs and no pericardial rub [FreeTextEntry1] : Examination of the Lumbar Spine: REFLEXES (R)LE: QUADRICEPS 2 ACHILLES 2 REFLEXES (L) LE: QUADRICEPS 2 ACHILLES 2  Flexion: 55 degrees Extension: 20 degrees (normal - 30) Lateral Bending ( R ) 35 degrees: (normal - 35) Lateral Bending ( L ): 30 degrees (normal - 35) Thoracic Rotation ( R ): 35 degrees (normal - 35) Thoracic Rotation ( L ): 30 degrees (normal - 35)  MMT: Intact Sensory L/E: Grossly intact SI Jt Lig.Challenege Test (R) positive SI Jt Lig.Ch[place]enege Test (L) negative S.L.R. ( R ) -70 degrees S.L.R. ( L ) -70 degrees  Palpation: Mild to moderate tenderness involving the lower lumbar paraspinal musculature trigger points involving gluteal musculature improved but persist.

## 2024-09-16 ENCOUNTER — APPOINTMENT (OUTPATIENT)
Dept: PHYSICAL MEDICINE AND REHAB | Facility: CLINIC | Age: 79
End: 2024-09-16

## 2024-09-16 DIAGNOSIS — G89.29 LOW BACK PAIN, UNSPECIFIED: ICD-10-CM

## 2024-09-16 DIAGNOSIS — M62.838 OTHER MUSCLE SPASM: ICD-10-CM

## 2024-09-16 DIAGNOSIS — M54.50 LOW BACK PAIN, UNSPECIFIED: ICD-10-CM

## 2024-09-16 PROCEDURE — 99213 OFFICE O/P EST LOW 20 MIN: CPT | Mod: 25

## 2024-09-16 PROCEDURE — 20553 NJX 1/MLT TRIGGER POINTS 3/>: CPT

## 2024-09-16 NOTE — END OF VISIT
[FreeTextEntry3] :  The following information has been documented by Flor Gonzalez acting as a scribe. The documentation recorded by the scribe, in my presence, accurately reflects the service I, Dr. Altamirano, personally performed, and the decisions made by me with my edits as appropriate.  [Time Spent: ___ minutes] : I have spent [unfilled] minutes of time on the encounter which excludes teaching and separately reported services.

## 2024-09-16 NOTE — PHYSICAL EXAM
[FreeTextEntry1] : Examination of the Lumbar Spine: REFLEXES (R)LE: QUADRICEPS 2 ACHILLES 2 REFLEXES (L) LE: QUADRICEPS 2 ACHILLES 2  Flexion: 55 degrees Extension: 25 degrees (normal - 30) Lateral Bending ( R ) 35 degrees: (normal - 35) Lateral Bending ( L ): 35 degrees (normal - 35) Thoracic Rotation ( R ): 35 degrees (normal - 35) Thoracic Rotation ( L ): 30 degrees (normal - 35)  MMT: Intact Sensory L/E: Grossly intact SI Jt Lig.Challenege Test (R) positive SI Jt Lig.[place]enege Test (L) negative S.L.R. ( R ) -70 degrees S.L.R. ( L ) -70 degrees  Palpation: Isolated trigger points identified involving the bilateral gluteus marvin and medius musculature   After today's examination additional trigger points were identified involving the bilateral gluteus marvin and medius musculature thus indicating the need for additional trigger point therapy.  Goals of which are to decrease pain, dissipate muscle spasm, increase ROM and improve level of function.     Sterile Technique Injection 2 Syringes of 5 cc 1 % Lidocaine HCL   bilateral gluteus marvin and medius musculature   Ice injection site PRN   Injection tolerated well.     Multiple areas were identified using palpation guidance. Using aseptic technique, each of these areas bilateral gluteus marvin and medius musculature were isolated and the skin prepped with alcohol.  A 22 gauge 1 1/2 inch needle was inserted to the level of the muscle. At this point, a slight twitch was elicited and in some cases the patient identified referred pain to areas distant from the injection site.  All of these were consistent with the definition of a trigger point.   Aspiration revealed no blood and a mixture of 5cc 1 % Lidocaine HCL was injected in increments of 1-2 mL into each of these areas for a total of 4 sites. The needle was removed. Hemostasis was achieved with direct pressure.  The patient tolerated the procedure well. Post-procedure exam did not reveal any new neurological deficits. The patient was instructed to call with fever, chills, increased pain, redness or swelling at the injection site, or numbness or weakness in the lower extremities. The patient was discharged home in good condition with post-procedural instructions.    At least 20 minutes was spent with patient face to face examining patient, reviewing findings/results, counseling patient and coordinating treatment program. Ample time was provided to answer any questions or address concerns to the patient's satisfaction.   Recheck 1- 2 weeks.  [Normal] : Heart rate was normal and rhythm regular, normal S1 and S2, no gallops, no murmurs and no pericardial rub

## 2024-09-16 NOTE — HISTORY OF PRESENT ILLNESS
[FreeTextEntry1] : Patient reports significant proved meant following last session of TPI therapy.  Reports decreased low back pain improved range of motion and dissipation of muscle spasm.  Continues to report intermittent low back pain with increased level of physical activity.

## 2024-10-07 ENCOUNTER — APPOINTMENT (OUTPATIENT)
Dept: PHYSICAL MEDICINE AND REHAB | Facility: CLINIC | Age: 79
End: 2024-10-07
Payer: MEDICARE

## 2024-10-07 DIAGNOSIS — G89.29 LOW BACK PAIN, UNSPECIFIED: ICD-10-CM

## 2024-10-07 DIAGNOSIS — M54.50 LOW BACK PAIN, UNSPECIFIED: ICD-10-CM

## 2024-10-07 DIAGNOSIS — M62.838 OTHER MUSCLE SPASM: ICD-10-CM

## 2024-10-07 PROCEDURE — 20553 NJX 1/MLT TRIGGER POINTS 3/>: CPT

## 2024-10-07 PROCEDURE — 99213 OFFICE O/P EST LOW 20 MIN: CPT | Mod: 25

## 2024-11-04 ENCOUNTER — APPOINTMENT (OUTPATIENT)
Dept: PHYSICAL MEDICINE AND REHAB | Facility: CLINIC | Age: 79
End: 2024-11-04

## 2024-11-04 DIAGNOSIS — G89.29 LOW BACK PAIN, UNSPECIFIED: ICD-10-CM

## 2024-11-04 DIAGNOSIS — M54.50 LOW BACK PAIN, UNSPECIFIED: ICD-10-CM

## 2024-11-04 DIAGNOSIS — M62.838 OTHER MUSCLE SPASM: ICD-10-CM

## 2024-11-04 PROCEDURE — 99213 OFFICE O/P EST LOW 20 MIN: CPT | Mod: 25

## 2024-11-04 PROCEDURE — 20553 NJX 1/MLT TRIGGER POINTS 3/>: CPT

## 2024-12-02 ENCOUNTER — APPOINTMENT (OUTPATIENT)
Dept: PHYSICAL MEDICINE AND REHAB | Facility: CLINIC | Age: 79
End: 2024-12-02
Payer: MEDICARE

## 2024-12-02 DIAGNOSIS — M54.50 LOW BACK PAIN, UNSPECIFIED: ICD-10-CM

## 2024-12-02 DIAGNOSIS — G89.29 LOW BACK PAIN, UNSPECIFIED: ICD-10-CM

## 2024-12-02 PROCEDURE — 99213 OFFICE O/P EST LOW 20 MIN: CPT | Mod: 25

## 2024-12-02 PROCEDURE — 20553 NJX 1/MLT TRIGGER POINTS 3/>: CPT

## 2024-12-09 ENCOUNTER — APPOINTMENT (OUTPATIENT)
Dept: PHYSICAL MEDICINE AND REHAB | Facility: CLINIC | Age: 79
End: 2024-12-09
Payer: MEDICARE

## 2024-12-09 DIAGNOSIS — M51.9 UNSPECIFIED THORACIC, THORACOLUMBAR AND LUMBOSACRAL INTERVERTEBRAL DISC DISORDER: ICD-10-CM

## 2024-12-09 DIAGNOSIS — M54.50 LOW BACK PAIN, UNSPECIFIED: ICD-10-CM

## 2024-12-09 DIAGNOSIS — G89.29 LOW BACK PAIN, UNSPECIFIED: ICD-10-CM

## 2024-12-09 PROCEDURE — 99213 OFFICE O/P EST LOW 20 MIN: CPT

## 2025-01-06 ENCOUNTER — APPOINTMENT (OUTPATIENT)
Dept: PHYSICAL MEDICINE AND REHAB | Facility: CLINIC | Age: 80
End: 2025-01-06
Payer: MEDICARE

## 2025-01-06 DIAGNOSIS — G89.29 LOW BACK PAIN, UNSPECIFIED: ICD-10-CM

## 2025-01-06 DIAGNOSIS — M54.50 LOW BACK PAIN, UNSPECIFIED: ICD-10-CM

## 2025-01-06 PROCEDURE — 99213 OFFICE O/P EST LOW 20 MIN: CPT | Mod: 25

## 2025-01-06 PROCEDURE — 20553 NJX 1/MLT TRIGGER POINTS 3/>: CPT

## 2025-02-10 ENCOUNTER — APPOINTMENT (OUTPATIENT)
Dept: PHYSICAL MEDICINE AND REHAB | Facility: CLINIC | Age: 80
End: 2025-02-10
Payer: MEDICARE

## 2025-02-10 DIAGNOSIS — M79.10 MYALGIA, UNSPECIFIED SITE: ICD-10-CM

## 2025-02-10 PROCEDURE — 99213 OFFICE O/P EST LOW 20 MIN: CPT | Mod: 25

## 2025-02-10 PROCEDURE — 20553 NJX 1/MLT TRIGGER POINTS 3/>: CPT

## 2025-03-04 ENCOUNTER — APPOINTMENT (OUTPATIENT)
Dept: PHYSICAL MEDICINE AND REHAB | Facility: CLINIC | Age: 80
End: 2025-03-04
Payer: MEDICARE

## 2025-03-04 DIAGNOSIS — M94.0 CHONDROCOSTAL JUNCTION SYNDROME [TIETZE]: ICD-10-CM

## 2025-03-04 DIAGNOSIS — M62.838 OTHER MUSCLE SPASM: ICD-10-CM

## 2025-03-04 DIAGNOSIS — G89.29 LOW BACK PAIN, UNSPECIFIED: ICD-10-CM

## 2025-03-04 DIAGNOSIS — M54.17 RADICULOPATHY, LUMBOSACRAL REGION: ICD-10-CM

## 2025-03-04 DIAGNOSIS — M54.50 LOW BACK PAIN, UNSPECIFIED: ICD-10-CM

## 2025-03-04 PROCEDURE — 99213 OFFICE O/P EST LOW 20 MIN: CPT

## 2025-03-07 ENCOUNTER — RESULT REVIEW (OUTPATIENT)
Age: 80
End: 2025-03-07

## 2025-03-10 ENCOUNTER — APPOINTMENT (OUTPATIENT)
Dept: PHYSICAL MEDICINE AND REHAB | Facility: CLINIC | Age: 80
End: 2025-03-10
Payer: MEDICARE

## 2025-03-10 DIAGNOSIS — M79.10 MYALGIA, UNSPECIFIED SITE: ICD-10-CM

## 2025-03-10 DIAGNOSIS — M51.9 UNSPECIFIED THORACIC, THORACOLUMBAR AND LUMBOSACRAL INTERVERTEBRAL DISC DISORDER: ICD-10-CM

## 2025-03-10 PROCEDURE — 20553 NJX 1/MLT TRIGGER POINTS 3/>: CPT

## 2025-03-10 PROCEDURE — 99213 OFFICE O/P EST LOW 20 MIN: CPT | Mod: 25

## 2025-04-21 ENCOUNTER — APPOINTMENT (OUTPATIENT)
Dept: PHYSICAL MEDICINE AND REHAB | Facility: CLINIC | Age: 80
End: 2025-04-21
Payer: MEDICARE

## 2025-04-21 DIAGNOSIS — M79.10 MYALGIA, UNSPECIFIED SITE: ICD-10-CM

## 2025-04-21 DIAGNOSIS — M51.9 UNSPECIFIED THORACIC, THORACOLUMBAR AND LUMBOSACRAL INTERVERTEBRAL DISC DISORDER: ICD-10-CM

## 2025-04-21 PROCEDURE — 20553 NJX 1/MLT TRIGGER POINTS 3/>: CPT

## 2025-04-21 PROCEDURE — 99213 OFFICE O/P EST LOW 20 MIN: CPT | Mod: 25

## 2025-06-04 ENCOUNTER — APPOINTMENT (OUTPATIENT)
Dept: PHYSICAL MEDICINE AND REHAB | Facility: CLINIC | Age: 80
End: 2025-06-04
Payer: MEDICARE

## 2025-06-04 DIAGNOSIS — M51.9 UNSPECIFIED THORACIC, THORACOLUMBAR AND LUMBOSACRAL INTERVERTEBRAL DISC DISORDER: ICD-10-CM

## 2025-06-04 DIAGNOSIS — M79.10 MYALGIA, UNSPECIFIED SITE: ICD-10-CM

## 2025-06-04 PROCEDURE — 99213 OFFICE O/P EST LOW 20 MIN: CPT | Mod: 25

## 2025-06-04 PROCEDURE — 20553 NJX 1/MLT TRIGGER POINTS 3/>: CPT

## 2025-07-14 ENCOUNTER — APPOINTMENT (OUTPATIENT)
Dept: PHYSICAL MEDICINE AND REHAB | Facility: CLINIC | Age: 80
End: 2025-07-14
Payer: MEDICARE

## 2025-07-14 PROCEDURE — 20553 NJX 1/MLT TRIGGER POINTS 3/>: CPT

## 2025-07-14 PROCEDURE — 99213 OFFICE O/P EST LOW 20 MIN: CPT | Mod: 25

## 2025-07-28 ENCOUNTER — APPOINTMENT (OUTPATIENT)
Dept: PHYSICAL MEDICINE AND REHAB | Facility: CLINIC | Age: 80
End: 2025-07-28
Payer: MEDICARE

## 2025-07-28 DIAGNOSIS — M54.50 LOW BACK PAIN, UNSPECIFIED: ICD-10-CM

## 2025-07-28 DIAGNOSIS — M54.17 RADICULOPATHY, LUMBOSACRAL REGION: ICD-10-CM

## 2025-07-28 DIAGNOSIS — M54.32 SCIATICA, LEFT SIDE: ICD-10-CM

## 2025-07-28 DIAGNOSIS — M51.9 UNSPECIFIED THORACIC, THORACOLUMBAR AND LUMBOSACRAL INTERVERTEBRAL DISC DISORDER: ICD-10-CM

## 2025-07-28 DIAGNOSIS — G89.29 LOW BACK PAIN, UNSPECIFIED: ICD-10-CM

## 2025-07-28 PROCEDURE — 99213 OFFICE O/P EST LOW 20 MIN: CPT

## 2025-08-20 ENCOUNTER — APPOINTMENT (OUTPATIENT)
Dept: PHYSICAL MEDICINE AND REHAB | Facility: CLINIC | Age: 80
End: 2025-08-20
Payer: MEDICARE

## 2025-08-20 DIAGNOSIS — G89.29 LOW BACK PAIN, UNSPECIFIED: ICD-10-CM

## 2025-08-20 DIAGNOSIS — M62.838 OTHER MUSCLE SPASM: ICD-10-CM

## 2025-08-20 DIAGNOSIS — M54.17 RADICULOPATHY, LUMBOSACRAL REGION: ICD-10-CM

## 2025-08-20 DIAGNOSIS — M54.50 LOW BACK PAIN, UNSPECIFIED: ICD-10-CM

## 2025-08-20 DIAGNOSIS — M79.10 MYALGIA, UNSPECIFIED SITE: ICD-10-CM

## 2025-08-20 DIAGNOSIS — M51.9 UNSPECIFIED THORACIC, THORACOLUMBAR AND LUMBOSACRAL INTERVERTEBRAL DISC DISORDER: ICD-10-CM

## 2025-08-20 PROCEDURE — 99213 OFFICE O/P EST LOW 20 MIN: CPT | Mod: 25

## 2025-08-20 PROCEDURE — 20553 NJX 1/MLT TRIGGER POINTS 3/>: CPT

## 2025-08-25 ENCOUNTER — APPOINTMENT (OUTPATIENT)
Dept: PHYSICAL MEDICINE AND REHAB | Facility: CLINIC | Age: 80
End: 2025-08-25